# Patient Record
Sex: MALE | Race: WHITE | Employment: OTHER | URBAN - METROPOLITAN AREA
[De-identification: names, ages, dates, MRNs, and addresses within clinical notes are randomized per-mention and may not be internally consistent; named-entity substitution may affect disease eponyms.]

---

## 2018-08-06 ENCOUNTER — HOSPITAL ENCOUNTER (OUTPATIENT)
Age: 63
Discharge: HOME | End: 2018-08-08
Payer: COMMERCIAL

## 2018-08-06 LAB — PROSTATE SPECIFIC ANTIGEN: 1.98 NG/ML (ref 0–4)

## 2018-08-06 PROCEDURE — G0103 PSA SCREENING: HCPCS

## 2018-11-05 ENCOUNTER — OFFICE VISIT (OUTPATIENT)
Dept: BARIATRICS/WEIGHT MGMT | Age: 63
End: 2018-11-05
Payer: COMMERCIAL

## 2018-11-05 VITALS
WEIGHT: 236.8 LBS | SYSTOLIC BLOOD PRESSURE: 157 MMHG | HEART RATE: 79 BPM | BODY MASS INDEX: 37.17 KG/M2 | TEMPERATURE: 98.1 F | DIASTOLIC BLOOD PRESSURE: 80 MMHG | HEIGHT: 67 IN

## 2018-11-05 DIAGNOSIS — E11.9 TYPE 2 DIABETES MELLITUS WITHOUT COMPLICATION, WITHOUT LONG-TERM CURRENT USE OF INSULIN (HCC): Primary | ICD-10-CM

## 2018-11-05 DIAGNOSIS — E66.9 OBESITY (BMI 35.0-39.9 WITHOUT COMORBIDITY): ICD-10-CM

## 2018-11-05 PROCEDURE — G8417 CALC BMI ABV UP PARAM F/U: HCPCS | Performed by: INTERNAL MEDICINE

## 2018-11-05 PROCEDURE — 1036F TOBACCO NON-USER: CPT | Performed by: INTERNAL MEDICINE

## 2018-11-05 PROCEDURE — 99203 OFFICE O/P NEW LOW 30 MIN: CPT | Performed by: INTERNAL MEDICINE

## 2018-11-05 PROCEDURE — G8484 FLU IMMUNIZE NO ADMIN: HCPCS | Performed by: INTERNAL MEDICINE

## 2018-11-05 PROCEDURE — 3046F HEMOGLOBIN A1C LEVEL >9.0%: CPT | Performed by: INTERNAL MEDICINE

## 2018-11-05 PROCEDURE — 2022F DILAT RTA XM EVC RTNOPTHY: CPT | Performed by: INTERNAL MEDICINE

## 2018-11-05 PROCEDURE — 3017F COLORECTAL CA SCREEN DOC REV: CPT | Performed by: INTERNAL MEDICINE

## 2018-11-05 PROCEDURE — 99202 OFFICE O/P NEW SF 15 MIN: CPT

## 2018-11-05 PROCEDURE — G8427 DOCREV CUR MEDS BY ELIG CLIN: HCPCS | Performed by: INTERNAL MEDICINE

## 2018-11-05 RX ORDER — GLUCOSAMINE HCL/CHONDROITIN SU 500-400 MG
CAPSULE ORAL
Qty: 100 STRIP | Refills: 1 | Status: SHIPPED
Start: 2018-11-05 | End: 2020-05-13 | Stop reason: ALTCHOICE

## 2018-11-05 RX ORDER — GEMFIBROZIL 600 MG/1
600 TABLET, FILM COATED ORAL 2 TIMES DAILY
COMMUNITY
Start: 2018-10-02 | End: 2022-10-17 | Stop reason: SDUPTHER

## 2018-11-05 RX ORDER — BLOOD-GLUCOSE METER
KIT MISCELLANEOUS
Qty: 1 KIT | Refills: 0 | Status: SHIPPED
Start: 2018-11-05 | End: 2020-05-13 | Stop reason: ALTCHOICE

## 2018-11-05 RX ORDER — INFLUENZA VIRUS VACCINE 15; 15; 15; 15 UG/.5ML; UG/.5ML; UG/.5ML; UG/.5ML
SUSPENSION INTRAMUSCULAR
COMMUNITY
Start: 2018-09-11 | End: 2020-05-13 | Stop reason: ALTCHOICE

## 2018-11-05 RX ORDER — LANCETS 30 GAUGE
EACH MISCELLANEOUS
Qty: 100 EACH | Refills: 1 | Status: SHIPPED
Start: 2018-11-05 | End: 2020-05-13 | Stop reason: ALTCHOICE

## 2018-11-05 RX ORDER — GLIMEPIRIDE 2 MG/1
4 TABLET ORAL DAILY
COMMUNITY
Start: 2018-08-23 | End: 2022-10-29 | Stop reason: SDUPTHER

## 2018-11-05 RX ORDER — DOXYCYCLINE 100 MG/1
100 TABLET ORAL PRN
COMMUNITY
Start: 2018-09-21 | End: 2020-05-13 | Stop reason: ALTCHOICE

## 2018-11-05 RX ORDER — FINASTERIDE 5 MG/1
5 TABLET, FILM COATED ORAL NIGHTLY
Refills: 0 | COMMUNITY
Start: 2018-09-28

## 2018-11-05 NOTE — PATIENT INSTRUCTIONS
Protein intake should be 70 grams each day  Fiber intake should be 28 grams each day  Take a multivitamin daily (Centrum or One-A-Day)    Breakfast -  one high protein shake  400 kaley 1 cup of the original plain Fiber One cereal    8 oz of 1% or skim milk    Mid-morn  One snack item   Snack  100 kaley    Lunch -  one high protein shake  400   + 1 cup of soup with 5 crackers    Mid-aftern  One snack item   Snack  100 kaley    Dinner -  one frozen meal and 100 kaley of frozen vegetables  500 kaley + one snack item      Shake options:  Premier, Ensure Max and The Praccel options: one small (gala not Honeycrisp) piece of fruit, one 100 kaley serving of low fat [place] yogurt, one 100 kaley serving of low fat cottage cheese, one low fat mozzarella cheese stick, one 100 kaley pack of almonds, 1/2 cup of the original plain Fiber One cereal    Frozen meal options:  Weight Watchers Smart Ones, Lean Cuisine, Healthy Choice (approx 300 kaley each)    Frozen vegetable options:  Bird's Eye, [employer] Giant    Call me with any questions or concerns:  [phone]

## 2018-11-15 ENCOUNTER — CLINICAL DOCUMENTATION (OUTPATIENT)
Dept: ENDOCRINOLOGY | Age: 63
End: 2018-11-15

## 2019-04-02 ENCOUNTER — HOSPITAL ENCOUNTER (OUTPATIENT)
Age: 64
Discharge: HOME | End: 2019-04-04
Payer: COMMERCIAL

## 2019-04-02 PROCEDURE — 87088 URINE BACTERIA CULTURE: CPT

## 2019-04-04 LAB — URINE CULTURE, ROUTINE: NORMAL

## 2019-09-09 ENCOUNTER — HOSPITAL ENCOUNTER (OUTPATIENT)
Dept: NUCLEAR MEDICINE | Age: 64
Discharge: HOME | End: 2019-09-09
Payer: COMMERCIAL

## 2019-09-09 ENCOUNTER — HOSPITAL ENCOUNTER (OUTPATIENT)
Dept: NON INVASIVE DIAGNOSTICS | Age: 64
Discharge: HOME | End: 2019-09-09
Payer: COMMERCIAL

## 2019-09-09 VITALS — HEIGHT: 67 IN | WEIGHT: 225 LBS | BODY MASS INDEX: 35.31 KG/M2

## 2019-09-09 LAB
LV EF: 80 %
LVEF MODALITY: NORMAL

## 2019-09-09 PROCEDURE — 93017 CV STRESS TEST TRACING ONLY: CPT

## 2019-09-09 PROCEDURE — 78452 HT MUSCLE IMAGE SPECT MULT: CPT

## 2019-09-09 PROCEDURE — 93018 CV STRESS TEST I&R ONLY: CPT | Performed by: INTERNAL MEDICINE

## 2019-09-09 PROCEDURE — 3430000000 HC RX DIAGNOSTIC RADIOPHARMACEUTICAL: Performed by: RADIOLOGY

## 2019-09-09 PROCEDURE — 93016 CV STRESS TEST SUPVJ ONLY: CPT | Performed by: INTERNAL MEDICINE

## 2019-09-09 PROCEDURE — A9500 TC99M SESTAMIBI: HCPCS | Performed by: RADIOLOGY

## 2019-09-09 RX ADMIN — Medication 10 MILLICURIE: at 08:49

## 2019-09-09 RX ADMIN — Medication 30 MILLICURIE: at 08:49

## 2020-05-13 ENCOUNTER — HOSPITAL ENCOUNTER (EMERGENCY)
Age: 65
Discharge: HOME | End: 2020-05-13
Attending: EMERGENCY MEDICINE
Payer: MEDICARE

## 2020-05-13 ENCOUNTER — APPOINTMENT (OUTPATIENT)
Dept: CT IMAGING | Age: 65
End: 2020-05-13
Payer: MEDICARE

## 2020-05-13 VITALS
DIASTOLIC BLOOD PRESSURE: 80 MMHG | OXYGEN SATURATION: 98 % | BODY MASS INDEX: 34.21 KG/M2 | SYSTOLIC BLOOD PRESSURE: 142 MMHG | TEMPERATURE: 98.2 F | WEIGHT: 218 LBS | HEART RATE: 76 BPM | RESPIRATION RATE: 16 BRPM | HEIGHT: 67 IN

## 2020-05-13 LAB
ALBUMIN SERPL-MCNC: 4.7 G/DL (ref 3.5–5.2)
ALP BLD-CCNC: 69 U/L (ref 40–129)
ALT SERPL-CCNC: 36 U/L (ref 0–40)
ANION GAP SERPL CALCULATED.3IONS-SCNC: 17 MMOL/L (ref 7–16)
ANION GAP SERPL CALCULATED.3IONS-SCNC: 19 MMOL/L (ref 7–16)
AST SERPL-CCNC: 29 U/L (ref 0–39)
BACTERIA: ABNORMAL /HPF
BASOPHILS ABSOLUTE: 0.06 E9/L (ref 0–0.2)
BASOPHILS RELATIVE PERCENT: 0.5 % (ref 0–2)
BILIRUB SERPL-MCNC: 0.7 MG/DL (ref 0–1.2)
BILIRUBIN URINE: NEGATIVE
BLOOD, URINE: ABNORMAL
BUN BLDV-MCNC: 37 MG/DL (ref 8–23)
BUN BLDV-MCNC: 38 MG/DL (ref 8–23)
CALCIUM SERPL-MCNC: 10.4 MG/DL (ref 8.6–10.2)
CALCIUM SERPL-MCNC: 10.6 MG/DL (ref 8.6–10.2)
CHLORIDE BLD-SCNC: 95 MMOL/L (ref 98–107)
CHLORIDE BLD-SCNC: 99 MMOL/L (ref 98–107)
CLARITY: CLEAR
CO2: 21 MMOL/L (ref 22–29)
CO2: 21 MMOL/L (ref 22–29)
COLOR: YELLOW
CREAT SERPL-MCNC: 0.8 MG/DL (ref 0.7–1.2)
CREAT SERPL-MCNC: 0.9 MG/DL (ref 0.7–1.2)
EKG ATRIAL RATE: 78 BPM
EKG P AXIS: 52 DEGREES
EKG P-R INTERVAL: 160 MS
EKG Q-T INTERVAL: 374 MS
EKG QRS DURATION: 92 MS
EKG QTC CALCULATION (BAZETT): 426 MS
EKG R AXIS: 1 DEGREES
EKG T AXIS: 23 DEGREES
EKG VENTRICULAR RATE: 78 BPM
EOSINOPHILS ABSOLUTE: 0 E9/L (ref 0.05–0.5)
EOSINOPHILS RELATIVE PERCENT: 0 % (ref 0–6)
EPITHELIAL CELLS, UA: ABNORMAL /HPF
GFR AFRICAN AMERICAN: >60
GFR AFRICAN AMERICAN: >60
GFR NON-AFRICAN AMERICAN: >60 ML/MIN/1.73
GFR NON-AFRICAN AMERICAN: >60 ML/MIN/1.73
GLUCOSE BLD-MCNC: 429 MG/DL (ref 74–99)
GLUCOSE BLD-MCNC: 540 MG/DL (ref 74–99)
GLUCOSE URINE: >=1000 MG/DL
HCT VFR BLD CALC: 47.6 % (ref 37–54)
HEMOGLOBIN: 16.6 G/DL (ref 12.5–16.5)
IMMATURE GRANULOCYTES #: 0.2 E9/L
IMMATURE GRANULOCYTES %: 1.8 % (ref 0–5)
KETONES, URINE: NEGATIVE MG/DL
LACTIC ACID: 2.6 MMOL/L (ref 0.5–2.2)
LACTIC ACID: 3.1 MMOL/L (ref 0.5–2.2)
LEUKOCYTE ESTERASE, URINE: NEGATIVE
LIPASE: 97 U/L (ref 13–60)
LYMPHOCYTES ABSOLUTE: 1.35 E9/L (ref 1.5–4)
LYMPHOCYTES RELATIVE PERCENT: 12.2 % (ref 20–42)
MCH RBC QN AUTO: 31.2 PG (ref 26–35)
MCHC RBC AUTO-ENTMCNC: 34.9 % (ref 32–34.5)
MCV RBC AUTO: 89.5 FL (ref 80–99.9)
MONOCYTES ABSOLUTE: 0.54 E9/L (ref 0.1–0.95)
MONOCYTES RELATIVE PERCENT: 4.9 % (ref 2–12)
NEUTROPHILS ABSOLUTE: 8.92 E9/L (ref 1.8–7.3)
NEUTROPHILS RELATIVE PERCENT: 80.6 % (ref 43–80)
NITRITE, URINE: NEGATIVE
PDW BLD-RTO: 12.7 FL (ref 11.5–15)
PH UA: 5.5 (ref 5–9)
PH VENOUS: 7.4 (ref 7.35–7.45)
PLATELET # BLD: 327 E9/L (ref 130–450)
PMV BLD AUTO: 10.9 FL (ref 7–12)
POTASSIUM SERPL-SCNC: 4.6 MMOL/L (ref 3.5–5)
POTASSIUM SERPL-SCNC: 4.8 MMOL/L (ref 3.5–5)
PROTEIN UA: NEGATIVE MG/DL
RBC # BLD: 5.32 E12/L (ref 3.8–5.8)
RBC UA: ABNORMAL /HPF (ref 0–2)
SODIUM BLD-SCNC: 135 MMOL/L (ref 132–146)
SODIUM BLD-SCNC: 137 MMOL/L (ref 132–146)
SPECIFIC GRAVITY UA: 1.01 (ref 1–1.03)
TOTAL PROTEIN: 8 G/DL (ref 6.4–8.3)
TROPONIN: <0.01 NG/ML (ref 0–0.03)
UROBILINOGEN, URINE: 0.2 E.U./DL
WBC # BLD: 11.1 E9/L (ref 4.5–11.5)
WBC UA: ABNORMAL /HPF (ref 0–5)

## 2020-05-13 PROCEDURE — 82800 BLOOD PH: CPT

## 2020-05-13 PROCEDURE — 83605 ASSAY OF LACTIC ACID: CPT

## 2020-05-13 PROCEDURE — 85025 COMPLETE CBC W/AUTO DIFF WBC: CPT

## 2020-05-13 PROCEDURE — 2580000003 HC RX 258: Performed by: EMERGENCY MEDICINE

## 2020-05-13 PROCEDURE — 84484 ASSAY OF TROPONIN QUANT: CPT

## 2020-05-13 PROCEDURE — 93005 ELECTROCARDIOGRAM TRACING: CPT | Performed by: EMERGENCY MEDICINE

## 2020-05-13 PROCEDURE — 74176 CT ABD & PELVIS W/O CONTRAST: CPT

## 2020-05-13 PROCEDURE — 93010 ELECTROCARDIOGRAM REPORT: CPT | Performed by: INTERNAL MEDICINE

## 2020-05-13 PROCEDURE — 99285 EMERGENCY DEPT VISIT HI MDM: CPT

## 2020-05-13 PROCEDURE — 36415 COLL VENOUS BLD VENIPUNCTURE: CPT

## 2020-05-13 PROCEDURE — 83690 ASSAY OF LIPASE: CPT

## 2020-05-13 PROCEDURE — 81001 URINALYSIS AUTO W/SCOPE: CPT

## 2020-05-13 PROCEDURE — 80053 COMPREHEN METABOLIC PANEL: CPT

## 2020-05-13 PROCEDURE — 80048 BASIC METABOLIC PNL TOTAL CA: CPT

## 2020-05-13 RX ORDER — AMOXICILLIN 500 MG
CAPSULE ORAL
COMMUNITY

## 2020-05-13 RX ORDER — 0.9 % SODIUM CHLORIDE 0.9 %
1000 INTRAVENOUS SOLUTION INTRAVENOUS ONCE
Status: COMPLETED | OUTPATIENT
Start: 2020-05-13 | End: 2020-05-13

## 2020-05-13 RX ADMIN — SODIUM CHLORIDE 1000 ML: 9 INJECTION, SOLUTION INTRAVENOUS at 06:45

## 2020-05-13 ASSESSMENT — PAIN DESCRIPTION - ORIENTATION: ORIENTATION: LOWER

## 2020-05-13 ASSESSMENT — PAIN DESCRIPTION - PAIN TYPE: TYPE: ACUTE PAIN

## 2020-05-13 ASSESSMENT — PAIN DESCRIPTION - ONSET: ONSET: ON-GOING

## 2020-05-13 ASSESSMENT — PAIN DESCRIPTION - DESCRIPTORS: DESCRIPTORS: ACHING

## 2020-05-13 ASSESSMENT — PAIN DESCRIPTION - PROGRESSION: CLINICAL_PROGRESSION: NOT CHANGED

## 2020-05-13 ASSESSMENT — PAIN SCALES - GENERAL: PAINLEVEL_OUTOF10: 5

## 2020-05-13 ASSESSMENT — PAIN DESCRIPTION - FREQUENCY: FREQUENCY: INTERMITTENT

## 2020-05-13 ASSESSMENT — PAIN DESCRIPTION - LOCATION: LOCATION: ABDOMEN;BACK

## 2020-05-13 NOTE — ED PROVIDER NOTES
well nourished, no acute distress, non-toxic appearance   Eyes:  PERRL, conjunctiva normal, EOMI  HENT:  Atraumatic, external ears normal, nose normal, oropharynx moist. Neck- normal range of motion, no nuchal rigidity  Respiratory:  No respiratory distress, normal breath sounds, no rales, no wheezing   Cardiovascular:  Normal rate, normal rhythm, no murmurs, no gallops, no rubs. Radial pulses 2+ bilaterally. GI:  Soft, nondistended, normal bowel sounds, nontender, no organomegaly, no mass, no rebound, no guarding   :  No costovertebral angle tenderness   Musculoskeletal:  No edema, no tenderness, no deformities. Back-no midline or paraspinal cervical, thoracic or lumbar tenderness. No step-offs. Chest able. Pelvis stable. Moves all 4 extremities without difficulty or pain. Integument:  Well hydrated, dried poison ivy (with scratches and scabs) in patches on arms and legs (no evidence of infection). Adequate perfusion. Neurologic:  Alert & oriented x 3, CN 2-12 normal, normal gait, no focal deficits noted. Psychiatric:  Speech and behavior appropriate       -------------------------------------------------- RESULTS -------------------------------------------------  I have personally reviewed all laboratory and imaging results for this patient. Results are listed below.      LABS:  Results for orders placed or performed during the hospital encounter of 05/13/20   CBC auto differential   Result Value Ref Range    WBC 11.1 4.5 - 11.5 E9/L    RBC 5.32 3.80 - 5.80 E12/L    Hemoglobin 16.6 (H) 12.5 - 16.5 g/dL    Hematocrit 47.6 37.0 - 54.0 %    MCV 89.5 80.0 - 99.9 fL    MCH 31.2 26.0 - 35.0 pg    MCHC 34.9 (H) 32.0 - 34.5 %    RDW 12.7 11.5 - 15.0 fL    Platelets 086 313 - 035 E9/L    MPV 10.9 7.0 - 12.0 fL    Neutrophils % 80.6 (H) 43.0 - 80.0 %    Immature Granulocytes % 1.8 0.0 - 5.0 %    Lymphocytes % 12.2 (L) 20.0 - 42.0 %    Monocytes % 4.9 2.0 - 12.0 %    Eosinophils % 0.0 0.0 - 6.0 %    Basophils % 0.5 0.0 - 2.0 %    Neutrophils Absolute 8.92 (H) 1.80 - 7.30 E9/L    Immature Granulocytes # 0.20 E9/L    Lymphocytes Absolute 1.35 (L) 1.50 - 4.00 E9/L    Monocytes Absolute 0.54 0.10 - 0.95 E9/L    Eosinophils Absolute 0.00 (L) 0.05 - 0.50 E9/L    Basophils Absolute 0.06 0.00 - 0.20 E9/L   Comprehensive Metabolic Panel   Result Value Ref Range    Sodium 135 132 - 146 mmol/L    Potassium 4.8 3.5 - 5.0 mmol/L    Chloride 95 (L) 98 - 107 mmol/L    CO2 21 (L) 22 - 29 mmol/L    Anion Gap 19 (H) 7 - 16 mmol/L    Glucose 540 (HH) 74 - 99 mg/dL    BUN 38 (H) 8 - 23 mg/dL    CREATININE 0.9 0.7 - 1.2 mg/dL    GFR Non-African American >60 >=60 mL/min/1.73    GFR African American >60     Calcium 10.6 (H) 8.6 - 10.2 mg/dL    Total Protein 8.0 6.4 - 8.3 g/dL    Alb 4.7 3.5 - 5.2 g/dL    Total Bilirubin 0.7 0.0 - 1.2 mg/dL    Alkaline Phosphatase 69 40 - 129 U/L    ALT 36 0 - 40 U/L    AST 29 0 - 39 U/L   Lactic Acid, Plasma   Result Value Ref Range    Lactic Acid 3.1 (H) 0.5 - 2.2 mmol/L   Lipase   Result Value Ref Range    Lipase 97 (H) 13 - 60 U/L   Troponin   Result Value Ref Range    Troponin <0.01 0.00 - 0.03 ng/mL   Urinalysis with Microscopic   Result Value Ref Range    Color, UA Yellow Straw/Yellow    Clarity, UA Clear Clear    Glucose, Ur >=1000 (A) Negative mg/dL    Bilirubin Urine Negative Negative    Ketones, Urine Negative Negative mg/dL    Specific Gravity, UA 1.010 1.005 - 1.030    Blood, Urine SMALL (A) Negative    pH, UA 5.5 5.0 - 9.0    Protein, UA Negative Negative mg/dL    Urobilinogen, Urine 0.2 <2.0 E.U./dL    Nitrite, Urine Negative Negative    Leukocyte Esterase, Urine Negative Negative    WBC, UA 0-1 0 - 5 /HPF    RBC, UA 2-5 0 - 2 /HPF    Epithelial Cells, UA FEW /HPF    Bacteria, UA NONE SEEN None Seen /HPF   Basic Metabolic Panel   Result Value Ref Range    Sodium 137 132 - 146 mmol/L    Potassium 4.6 3.5 - 5.0 mmol/L    Chloride 99 98 - 107 mmol/L    CO2 21 (L) 22 - 29 mmol/L    Anion Gap 17 (H) 7 - care and counseling regarding the diagnosis and prognosis. Questions are answered at this time and they are agreeable with the plan.       --------------------------------- IMPRESSION AND DISPOSITION ---------------------------------    IMPRESSION  1. Uncontrolled type 2 diabetes mellitus with hyperglycemia ([address]    2. Fatty liver    3. Lower abdominal pain    4.  Enlarged prostate        DISPOSITION  Disposition: Discharge to home  Patient condition is stable                  Brannon Bnyum DO  05/13/20 112

## 2020-05-13 NOTE — ED NOTES
Patient educated with diabetic emergencies and different symptoms of hypoglycemia and hyperglycemia. Pt instructed to call PCP today and talk about his diabetes. Pt verbalized understanding of education and follow up.       Nikki España RN  05/13/20 3403

## 2020-10-09 LAB
A/G RATIO: NORMAL
ALBUMIN SERPL-MCNC: NORMAL G/DL
ALP BLD-CCNC: NORMAL U/L
ALT SERPL-CCNC: NORMAL U/L
AST SERPL-CCNC: NORMAL U/L
BILIRUB SERPL-MCNC: NORMAL MG/DL
BILIRUBIN DIRECT: NORMAL
BILIRUBIN, INDIRECT: NORMAL
GLOBULIN: NORMAL
PROTEIN TOTAL: NORMAL

## 2021-02-04 LAB
ALBUMIN SERPL-MCNC: NORMAL G/DL
ALP BLD-CCNC: NORMAL U/L
ALT SERPL-CCNC: NORMAL U/L
ANION GAP SERPL CALCULATED.3IONS-SCNC: NORMAL MMOL/L
AST SERPL-CCNC: NORMAL U/L
AVERAGE GLUCOSE: 192
BASOPHILS ABSOLUTE: NORMAL
BASOPHILS RELATIVE PERCENT: NORMAL
BILIRUB SERPL-MCNC: NORMAL MG/DL
BUN BLDV-MCNC: NORMAL MG/DL
CALCIUM SERPL-MCNC: NORMAL MG/DL
CHLORIDE BLD-SCNC: NORMAL MMOL/L
CHOLESTEROL, TOTAL: NORMAL
CHOLESTEROL/HDL RATIO: NORMAL
CO2: NORMAL
CREAT SERPL-MCNC: NORMAL MG/DL
EOSINOPHILS ABSOLUTE: NORMAL
EOSINOPHILS RELATIVE PERCENT: NORMAL
GFR CALCULATED: NORMAL
GLUCOSE BLD-MCNC: NORMAL MG/DL
HBA1C MFR BLD: 8.3 %
HCT VFR BLD CALC: NORMAL %
HDLC SERPL-MCNC: NORMAL MG/DL
HEMOGLOBIN: NORMAL
LDL CHOLESTEROL CALCULATED: NORMAL
LYMPHOCYTES ABSOLUTE: NORMAL
LYMPHOCYTES RELATIVE PERCENT: NORMAL
MCH RBC QN AUTO: NORMAL PG
MCHC RBC AUTO-ENTMCNC: NORMAL G/DL
MCV RBC AUTO: NORMAL FL
MONOCYTES ABSOLUTE: NORMAL
MONOCYTES RELATIVE PERCENT: NORMAL
NEUTROPHILS ABSOLUTE: NORMAL
NEUTROPHILS RELATIVE PERCENT: NORMAL
NONHDLC SERPL-MCNC: NORMAL MG/DL
PLATELET # BLD: NORMAL 10*3/UL
PMV BLD AUTO: NORMAL FL
POTASSIUM SERPL-SCNC: NORMAL MMOL/L
RBC # BLD: NORMAL 10*6/UL
SODIUM BLD-SCNC: NORMAL MMOL/L
TOTAL PROTEIN: NORMAL
TRIGL SERPL-MCNC: NORMAL MG/DL
VLDLC SERPL CALC-MCNC: NORMAL MG/DL
WBC # BLD: NORMAL 10*3/UL

## 2021-07-28 ENCOUNTER — HOSPITAL ENCOUNTER (OUTPATIENT)
Age: 66
Discharge: HOME | End: 2021-07-28
Payer: MEDICARE

## 2021-07-28 LAB
ALBUMIN SERPL-MCNC: 4.1 G/DL (ref 3.5–5.2)
ALP BLD-CCNC: 62 U/L (ref 40–129)
ALT SERPL-CCNC: 35 U/L (ref 0–40)
ANION GAP SERPL CALCULATED.3IONS-SCNC: 10 MMOL/L (ref 7–16)
AST SERPL-CCNC: 30 U/L (ref 0–39)
BILIRUB SERPL-MCNC: 0.9 MG/DL (ref 0–1.2)
BUN BLDV-MCNC: 15 MG/DL (ref 6–23)
CALCIUM SERPL-MCNC: 10 MG/DL (ref 8.6–10.2)
CHLORIDE BLD-SCNC: 99 MMOL/L (ref 98–107)
CHOLESTEROL, FASTING: 174 MG/DL (ref 0–199)
CO2: 26 MMOL/L (ref 22–29)
CREAT SERPL-MCNC: 0.8 MG/DL (ref 0.7–1.2)
GFR AFRICAN AMERICAN: >60
GFR NON-AFRICAN AMERICAN: >60 ML/MIN/1.73
GLUCOSE BLD-MCNC: 247 MG/DL (ref 74–99)
HBA1C MFR BLD: 9 % (ref 4–5.6)
HCT VFR BLD CALC: 42.8 % (ref 37–54)
HDLC SERPL-MCNC: 32 MG/DL
HEMOGLOBIN: 15 G/DL (ref 12.5–16.5)
LDL CHOLESTEROL CALCULATED: 77 MG/DL (ref 0–99)
MCH RBC QN AUTO: 32.8 PG (ref 26–35)
MCHC RBC AUTO-ENTMCNC: 35 % (ref 32–34.5)
MCV RBC AUTO: 93.4 FL (ref 80–99.9)
PDW BLD-RTO: 13.2 FL (ref 11.5–15)
PLATELET # BLD: 256 E9/L (ref 130–450)
PMV BLD AUTO: 10.7 FL (ref 7–12)
POTASSIUM SERPL-SCNC: 4.5 MMOL/L (ref 3.5–5)
PROSTATE SPECIFIC ANTIGEN: 1.52 NG/ML (ref 0–4)
RBC # BLD: 4.58 E12/L (ref 3.8–5.8)
SODIUM BLD-SCNC: 135 MMOL/L (ref 132–146)
TOTAL PROTEIN: 7.2 G/DL (ref 6.4–8.3)
TRIGLYCERIDE, FASTING: 325 MG/DL (ref 0–149)
VLDLC SERPL CALC-MCNC: 65 MG/DL
WBC # BLD: 5.8 E9/L (ref 4.5–11.5)

## 2021-07-28 PROCEDURE — 80061 LIPID PANEL: CPT

## 2021-07-28 PROCEDURE — 80053 COMPREHEN METABOLIC PANEL: CPT

## 2021-07-28 PROCEDURE — 36415 COLL VENOUS BLD VENIPUNCTURE: CPT

## 2021-07-28 PROCEDURE — 84153 ASSAY OF PSA TOTAL: CPT

## 2021-07-28 PROCEDURE — 85027 COMPLETE CBC AUTOMATED: CPT

## 2021-07-28 PROCEDURE — 83036 HEMOGLOBIN GLYCOSYLATED A1C: CPT

## 2021-09-30 LAB
ALBUMIN SERPL-MCNC: NORMAL G/DL
ALP BLD-CCNC: NORMAL U/L
ALT SERPL-CCNC: NORMAL U/L
ANION GAP SERPL CALCULATED.3IONS-SCNC: NORMAL MMOL/L
AST SERPL-CCNC: NORMAL U/L
AVERAGE GLUCOSE: NORMAL
BILIRUB SERPL-MCNC: NORMAL MG/DL
BUN BLDV-MCNC: NORMAL MG/DL
CALCIUM SERPL-MCNC: NORMAL MG/DL
CHLORIDE BLD-SCNC: NORMAL MMOL/L
CHOLESTEROL, TOTAL: NORMAL
CHOLESTEROL/HDL RATIO: NORMAL
CO2: NORMAL
CREAT SERPL-MCNC: NORMAL MG/DL
GFR CALCULATED: NORMAL
GLUCOSE BLD-MCNC: NORMAL MG/DL
HBA1C MFR BLD: 9 %
HDLC SERPL-MCNC: NORMAL MG/DL
LDL CHOLESTEROL CALCULATED: NORMAL
NONHDLC SERPL-MCNC: NORMAL MG/DL
POTASSIUM SERPL-SCNC: NORMAL MMOL/L
SODIUM BLD-SCNC: NORMAL MMOL/L
TOTAL PROTEIN: NORMAL
TRIGL SERPL-MCNC: NORMAL MG/DL
VLDLC SERPL CALC-MCNC: NORMAL MG/DL

## 2021-12-21 LAB
ALBUMIN SERPL-MCNC: NORMAL G/DL
ALP BLD-CCNC: NORMAL U/L
ALT SERPL-CCNC: NORMAL U/L
ANION GAP SERPL CALCULATED.3IONS-SCNC: NORMAL MMOL/L
AST SERPL-CCNC: NORMAL U/L
AVERAGE GLUCOSE: NORMAL
BILIRUB SERPL-MCNC: NORMAL MG/DL
BUN BLDV-MCNC: NORMAL MG/DL
CALCIUM SERPL-MCNC: NORMAL MG/DL
CHLORIDE BLD-SCNC: NORMAL MMOL/L
CHOLESTEROL, TOTAL: NORMAL
CHOLESTEROL/HDL RATIO: NORMAL
CO2: NORMAL
CREAT SERPL-MCNC: NORMAL MG/DL
GFR CALCULATED: NORMAL
GLUCOSE BLD-MCNC: NORMAL MG/DL
HBA1C MFR BLD: 9.7 %
HDLC SERPL-MCNC: NORMAL MG/DL
LDL CHOLESTEROL CALCULATED: NORMAL
NONHDLC SERPL-MCNC: NORMAL MG/DL
POTASSIUM SERPL-SCNC: NORMAL MMOL/L
SODIUM BLD-SCNC: NORMAL MMOL/L
TOTAL PROTEIN: NORMAL
TRIGL SERPL-MCNC: NORMAL MG/DL
VLDLC SERPL CALC-MCNC: NORMAL MG/DL

## 2021-12-27 LAB — FECAL BLOOD IMMUNOCHEMICAL TEST: NORMAL

## 2022-01-10 LAB — SARS-COV-2: NORMAL

## 2022-10-17 RX ORDER — GEMFIBROZIL 600 MG/1
600 TABLET, FILM COATED ORAL 2 TIMES DAILY
Qty: 180 TABLET | Refills: 0 | Status: SHIPPED | OUTPATIENT
Start: 2022-10-17

## 2022-10-17 NOTE — TELEPHONE ENCOUNTER
Received a call from George Laird requesting a refill of his Gemfibrozil 600 mg be sent into [facility] in [facility]. Requesting 180 tablets. Thank you.

## 2022-10-21 ENCOUNTER — OFFICE VISIT (OUTPATIENT)
Dept: PRIMARY CARE CLINIC | Age: 67
End: 2022-10-21
Payer: MEDICARE

## 2022-10-21 VITALS
TEMPERATURE: 97.9 F | DIASTOLIC BLOOD PRESSURE: 86 MMHG | BODY MASS INDEX: 34.06 KG/M2 | SYSTOLIC BLOOD PRESSURE: 136 MMHG | HEART RATE: 90 BPM | HEIGHT: 67 IN | WEIGHT: 217 LBS | OXYGEN SATURATION: 98 %

## 2022-10-21 DIAGNOSIS — U07.1 COVID-19: ICD-10-CM

## 2022-10-21 PROCEDURE — 87426 SARSCOV CORONAVIRUS AG IA: CPT | Performed by: INTERNAL MEDICINE

## 2022-10-21 PROCEDURE — 99213 OFFICE O/P EST LOW 20 MIN: CPT | Performed by: INTERNAL MEDICINE

## 2022-10-21 PROCEDURE — 1123F ACP DISCUSS/DSCN MKR DOCD: CPT | Performed by: INTERNAL MEDICINE

## 2022-10-21 RX ORDER — DEXAMETHASONE 0.5 MG/1
4 TABLET ORAL EVERY 6 HOURS
Qty: 224 TABLET | Refills: 0 | Status: SHIPPED | OUTPATIENT
Start: 2022-10-21 | End: 2022-10-28

## 2022-10-21 SDOH — ECONOMIC STABILITY: FOOD INSECURITY: WITHIN THE PAST 12 MONTHS, THE FOOD YOU BOUGHT JUST DIDN'T LAST AND YOU DIDN'T HAVE MONEY TO GET MORE.: NEVER TRUE

## 2022-10-21 SDOH — ECONOMIC STABILITY: FOOD INSECURITY: WITHIN THE PAST 12 MONTHS, YOU WORRIED THAT YOUR FOOD WOULD RUN OUT BEFORE YOU GOT MONEY TO BUY MORE.: NEVER TRUE

## 2022-10-21 ASSESSMENT — PATIENT HEALTH QUESTIONNAIRE - PHQ9
SUM OF ALL RESPONSES TO PHQ9 QUESTIONS 1 & 2: 0
SUM OF ALL RESPONSES TO PHQ QUESTIONS 1-9: 0
2. FEELING DOWN, DEPRESSED OR HOPELESS: 0
1. LITTLE INTEREST OR PLEASURE IN DOING THINGS: 0

## 2022-10-21 ASSESSMENT — SOCIAL DETERMINANTS OF HEALTH (SDOH): HOW HARD IS IT FOR YOU TO PAY FOR THE VERY BASICS LIKE FOOD, HOUSING, MEDICAL CARE, AND HEATING?: NOT HARD AT ALL

## 2022-10-21 NOTE — PROGRESS NOTES
Juan M Joshi is a 78 y.o. male with the following history of DM hyperlipidemia for follow up been coughing sore throat tired   Tested pos. For Covid     Past Medical History:   Diagnosis Date    Hyperlipidemia     Obesity (BMI 35.0-39.9 without comorbidity)     T2DM (type 2 diabetes mellitus) ([facility] [address]        No past surgical history on file. No family history on file. Current Outpatient Medications:     dexamethasone (DECADRON) 0.5 MG tablet, Take 8 tablets by mouth in the morning and 8 tablets at noon and 8 tablets in the evening and 8 tablets before bedtime. Do all this for 7 days. , Disp: 224 tablet, Rfl: 0    empagliflozin (JARDIANCE) 25 MG tablet, Take 25 mg by mouth daily, Disp: , Rfl:     gemfibrozil (LOPID) 600 MG tablet, Take 1 tablet by mouth 2 times daily, Disp: 180 tablet, Rfl: 0    metFORMIN (GLUCOPHAGE) 1000 MG tablet, Take 1 tablet by mouth 2 times daily (with meals), Disp: 180 tablet, Rfl: 1    Omega-3 Fatty Acids (FISH OIL) 1200 MG CAPS, Take by mouth, Disp: , Rfl:     glimepiride (AMARYL) 2 MG tablet, Take 4 mg by mouth daily, Disp: , Rfl:     finasteride (PROSCAR) 5 MG tablet, Take 5 mg by mouth nightly, Disp: , Rfl: 0    ASPIRIN 81 PO, Take 81 mg by mouth daily, Disp: , Rfl:      Allergies: Patient has no known allergies.     Social History     Tobacco Use    Smoking status: Never    Smokeless tobacco: Never   Substance Use Topics    Alcohol use: Not Currently        Review of Systems:  Respiratory: negative for cough and hemoptysis  Cardiovascular: negative for chest pain and dyspnea  Gastrointestinal: negative for abdominal pain, diarrhea, nausea and vomiting  Genitourinary:negative for dysuria and hematuria  Derm: negative for rash and skin lesion(s)  Neurological: negative for seizures and tremors  Endocrine: negative for diabetic symptoms including polydipsia and polyuria    Physical Exam:  Vitals:    10/21/22 1008   BP: 136/86   Pulse: 90   Temp: 97.9 °F (36.6 °C)   SpO2: 98%      Wt Readings from Last 3 Encounters:   10/21/22 217 lb (98.4 kg)   05/13/20 218 lb (98.9 kg)   09/09/19 225 lb (102.1 kg)       Skin:  Warm and dry. No rash or bruises  HEENT:  PERRLA, EOMI  Neck:  No JVD, No thyromegaly, No carotid bruit  Cardiac:  RRR, No gallop or murmur  Lungs:  CTA, Normal percussion  Abdomen: Normal bowel sounds, no HSM, non-tender  Extremities:  No clubbing, edema or cyanosis  Neurological:  Moves all extremities. Lab Results   Component Value Date     07/28/2021    K 4.5 07/28/2021    CL 99 07/28/2021    CO2 26 07/28/2021    BUN 15 07/28/2021    CREATININE 0.8 07/28/2021    GLUCOSE 247 (H) 07/28/2021    CALCIUM 10.0 07/28/2021    PROT 7.2 07/28/2021    LABALBU 4.1 07/28/2021    BILITOT 0.9 07/28/2021    ALKPHOS 62 07/28/2021    AST 30 07/28/2021    ALT 35 07/28/2021    LABGLOM >60 07/28/2021    GFRAA >60 07/28/2021     Lab Results   Component Value Date    WBC 5.8 07/28/2021    HGB 15.0 07/28/2021    HCT 42.8 07/28/2021    MCV 93.4 07/28/2021     07/28/2021     Lab Results   Component Value Date    CHOL 179 09/20/2017    TRIG 288 (H) 09/20/2017    HDL 32 07/28/2021    LDLCALC 77 07/28/2021    LABVLDL 65 07/28/2021     Lab Results   Component Value Date    PSA 1.75 08/08/2022    PSADIA 3.73 08/13/2012      Lab Results   Component Value Date    LABA1C 9.7 12/21/2021           Assessment and Plan:    Patient Active Problem List   Diagnosis    T2DM (type 2 diabetes mellitus) ([facility] [address]    Obesity (BMI 35.0-39.9 without comorbidity)    Precordial pain    COVID-19       Diagnosis/Orders  1. COVID-19  -     POCT COVID-19, Antigen    Decadron 4 mg qd 1 week  Vitamin   Return in about 3 months (around 1/21/2023).       Brayan Bejarano MD

## 2022-10-26 ENCOUNTER — TELEPHONE (OUTPATIENT)
Dept: PRIMARY CARE CLINIC | Age: 67
End: 2022-10-26

## 2022-10-26 NOTE — TELEPHONE ENCOUNTER
Patient called to let Dr know he stopped taking the below medication due to it gives him the hiccups and stops him from breathing     dexamethasone (DECADRON) 0.5 MG tablet      Patient asked that the Dr not call from personal cell phone as pt will not answer unknown calls , pt asked that Dr call from the office phone

## 2022-10-29 RX ORDER — GLIMEPIRIDE 2 MG/1
4 TABLET ORAL DAILY
Qty: 90 TABLET | Refills: 0 | Status: SHIPPED | OUTPATIENT
Start: 2022-10-29

## 2022-11-04 ENCOUNTER — TELEPHONE (OUTPATIENT)
Dept: PRIMARY CARE CLINIC | Age: 67
End: 2022-11-04

## 2022-11-04 NOTE — TELEPHONE ENCOUNTER
Received a call from Zoe Roberto stating he was here last week and was told to take over the counter medications for his cough. States they are not helping and is requesting something be called into [facility] in [place] for his cough. Thank you.

## 2022-11-08 ENCOUNTER — OFFICE VISIT (OUTPATIENT)
Dept: PRIMARY CARE CLINIC | Age: 67
End: 2022-11-08
Payer: MEDICARE

## 2022-11-08 VITALS
HEART RATE: 72 BPM | TEMPERATURE: 98.4 F | WEIGHT: 216 LBS | BODY MASS INDEX: 33.9 KG/M2 | HEIGHT: 67 IN | OXYGEN SATURATION: 98 % | SYSTOLIC BLOOD PRESSURE: 130 MMHG | DIASTOLIC BLOOD PRESSURE: 62 MMHG

## 2022-11-08 DIAGNOSIS — R05.3 CHRONIC COUGH: Primary | ICD-10-CM

## 2022-11-08 PROCEDURE — 99213 OFFICE O/P EST LOW 20 MIN: CPT | Performed by: INTERNAL MEDICINE

## 2022-11-08 PROCEDURE — 1123F ACP DISCUSS/DSCN MKR DOCD: CPT | Performed by: INTERNAL MEDICINE

## 2022-11-08 RX ORDER — METHOTREXATE 2.5 MG/1
3 TABLET ORAL
COMMUNITY
Start: 2022-08-30

## 2022-11-08 RX ORDER — BENZONATATE 100 MG/1
100 CAPSULE ORAL 2 TIMES DAILY PRN
Qty: 20 CAPSULE | Refills: 0 | Status: SHIPPED | OUTPATIENT
Start: 2022-11-08 | End: 2022-11-15

## 2022-11-08 NOTE — PROGRESS NOTES
Shira Carcamo is a 78 y.o. male with cough for few weeks taking over the counter meds no help no fever no sore throat or congestion     Past Medical History:   Diagnosis Date    Hyperlipidemia     Obesity (BMI 35.0-39.9 without comorbidity)     T2DM (type 2 diabetes mellitus) ([facility] [address]        No past surgical history on file. No family history on file. Current Outpatient Medications:     methotrexate (RHEUMATREX) 2.5 MG chemo tablet, Take 3 tablets by mouth, Disp: , Rfl:     benzonatate (TESSALON) 100 MG capsule, Take 1 capsule by mouth 2 times daily as needed for Cough, Disp: 20 capsule, Rfl: 0    glimepiride (AMARYL) 2 MG tablet, Take 2 tablets by mouth daily, Disp: 90 tablet, Rfl: 0    empagliflozin (JARDIANCE) 25 MG tablet, Take 25 mg by mouth daily, Disp: , Rfl:     gemfibrozil (LOPID) 600 MG tablet, Take 1 tablet by mouth 2 times daily, Disp: 180 tablet, Rfl: 0    metFORMIN (GLUCOPHAGE) 1000 MG tablet, Take 1 tablet by mouth 2 times daily (with meals), Disp: 180 tablet, Rfl: 1    Omega-3 Fatty Acids (FISH OIL) 1200 MG CAPS, Take by mouth, Disp: , Rfl:     finasteride (PROSCAR) 5 MG tablet, Take 5 mg by mouth nightly, Disp: , Rfl: 0    ASPIRIN 81 PO, Take 81 mg by mouth daily, Disp: , Rfl:      Allergies: Patient has no known allergies.     Social History     Tobacco Use    Smoking status: Never    Smokeless tobacco: Never   Substance Use Topics    Alcohol use: Not Currently        Review of Systems:  Respiratory: negative for cough and hemoptysis  Cardiovascular: negative for chest pain and dyspnea  Gastrointestinal: negative for abdominal pain, diarrhea, nausea and vomiting  Genitourinary:negative for dysuria and hematuria  Derm: negative for rash and skin lesion(s)  Neurological: negative for seizures and tremors  Endocrine: negative for diabetic symptoms including polydipsia and polyuria    Physical Exam:  Vitals:    11/08/22 0854   BP: 130/62   Pulse: 72   Temp: 98.4 °F (36.9 °C)   SpO2: 98%      Wt Readings from Last 3 Encounters:   11/08/22 216 lb (98 kg)   10/21/22 217 lb (98.4 kg)   05/13/20 218 lb (98.9 kg)       Skin:  Warm and dry. No rash or bruises  HEENT:  PERRLA, EOMI  Neck:  No JVD, No thyromegaly, No carotid bruit  Cardiac:  RRR, No gallop or murmur  Lungs:  CTA, Normal percussion  Abdomen: Normal bowel sounds, no HSM, non-tender  Extremities:  No clubbing, edema or cyanosis  Neurological:  Moves all extremities    Lab Results   Component Value Date     07/28/2021    K 4.5 07/28/2021    CL 99 07/28/2021    CO2 26 07/28/2021    BUN 15 07/28/2021    CREATININE 0.8 07/28/2021    GLUCOSE 247 (H) 07/28/2021    CALCIUM 10.0 07/28/2021    PROT 7.2 07/28/2021    LABALBU 4.1 07/28/2021    BILITOT 0.9 07/28/2021    ALKPHOS 62 07/28/2021    AST 30 07/28/2021    ALT 35 07/28/2021    LABGLOM >60 07/28/2021    GFRAA >60 07/28/2021     Lab Results   Component Value Date    WBC 5.8 07/28/2021    HGB 15.0 07/28/2021    HCT 42.8 07/28/2021    MCV 93.4 07/28/2021     07/28/2021     Lab Results   Component Value Date    CHOL 179 09/20/2017    TRIG 288 (H) 09/20/2017    HDL 32 07/28/2021    LDLCALC 77 07/28/2021    LABVLDL 65 07/28/2021     Lab Results   Component Value Date    PSA 1.75 08/08/2022    PSADIA 3.73 08/13/2012      Lab Results   Component Value Date    LABA1C 9.7 12/21/2021           Assessment and Plan:    Patient Active Problem List   Diagnosis    T2DM (type 2 diabetes mellitus) (HCC)    Obesity (BMI 35.0-39.9 without comorbidity)    Precordial pain    COVID-19       Diagnosis/Orders  1. Chronic cough    No follow-ups on file.       Emir Novoa MD

## 2022-11-18 ENCOUNTER — TELEPHONE (OUTPATIENT)
Dept: PRIMARY CARE CLINIC | Age: 67
End: 2022-11-18

## 2022-11-18 NOTE — TELEPHONE ENCOUNTER
Pt called stating he still has a cough after finishing the medication that was sent in for him on 11/8/2022.  He is now requesting to have a cough syrup filled for him at [address] [place] [address]

## 2022-12-21 ENCOUNTER — HOSPITAL ENCOUNTER (OUTPATIENT)
Age: 67
Discharge: HOME | End: 2022-12-23

## 2023-01-31 ENCOUNTER — COMMUNITY OUTREACH (OUTPATIENT)
Dept: PRIMARY CARE CLINIC | Age: 68
End: 2023-01-31

## 2023-02-04 ENCOUNTER — HOSPITAL ENCOUNTER (EMERGENCY)
Age: 68
Discharge: HOME | End: 2023-02-05
Attending: EMERGENCY MEDICINE
Payer: MEDICARE

## 2023-02-04 DIAGNOSIS — R33.9 URINARY RETENTION: Primary | ICD-10-CM

## 2023-02-04 DIAGNOSIS — R31.9 URINARY TRACT INFECTION WITH HEMATURIA, SITE UNSPECIFIED: ICD-10-CM

## 2023-02-04 DIAGNOSIS — N39.0 URINARY TRACT INFECTION WITH HEMATURIA, SITE UNSPECIFIED: ICD-10-CM

## 2023-02-04 PROCEDURE — 99284 EMERGENCY DEPT VISIT MOD MDM: CPT

## 2023-02-04 PROCEDURE — 96372 THER/PROPH/DIAG INJ SC/IM: CPT

## 2023-02-04 PROCEDURE — 96374 THER/PROPH/DIAG INJ IV PUSH: CPT

## 2023-02-04 ASSESSMENT — PAIN DESCRIPTION - ORIENTATION: ORIENTATION: MID

## 2023-02-04 ASSESSMENT — PAIN DESCRIPTION - FREQUENCY: FREQUENCY: CONTINUOUS

## 2023-02-04 ASSESSMENT — PAIN DESCRIPTION - ONSET: ONSET: SUDDEN

## 2023-02-04 ASSESSMENT — PAIN - FUNCTIONAL ASSESSMENT: PAIN_FUNCTIONAL_ASSESSMENT: 0-10

## 2023-02-04 ASSESSMENT — PAIN DESCRIPTION - DESCRIPTORS: DESCRIPTORS: PRESSURE

## 2023-02-04 ASSESSMENT — PAIN DESCRIPTION - PAIN TYPE: TYPE: ACUTE PAIN

## 2023-02-04 ASSESSMENT — PAIN SCALES - GENERAL: PAINLEVEL_OUTOF10: 10

## 2023-02-04 ASSESSMENT — PAIN DESCRIPTION - LOCATION: LOCATION: ABDOMEN;PELVIS

## 2023-02-05 ENCOUNTER — APPOINTMENT (OUTPATIENT)
Dept: CT IMAGING | Age: 68
End: 2023-02-05
Payer: MEDICARE

## 2023-02-05 ENCOUNTER — HOSPITAL ENCOUNTER (INPATIENT)
Age: 68
LOS: 1 days | Discharge: HOME HEALTH | DRG: 726 | End: 2023-02-06
Attending: INTERNAL MEDICINE | Admitting: INTERNAL MEDICINE
Payer: MEDICARE

## 2023-02-05 ENCOUNTER — HOSPITAL ENCOUNTER (EMERGENCY)
Age: 68
Discharge: HOME | End: 2023-02-05
Attending: STUDENT IN AN ORGANIZED HEALTH CARE EDUCATION/TRAINING PROGRAM
Payer: MEDICARE

## 2023-02-05 ENCOUNTER — HOSPITAL ENCOUNTER (EMERGENCY)
Age: 68
Discharge: ANOTHER HOSPITAL | End: 2023-02-05
Payer: MEDICARE

## 2023-02-05 VITALS
SYSTOLIC BLOOD PRESSURE: 133 MMHG | OXYGEN SATURATION: 97 % | HEIGHT: 67 IN | WEIGHT: 200 LBS | BODY MASS INDEX: 31.39 KG/M2 | DIASTOLIC BLOOD PRESSURE: 67 MMHG | HEART RATE: 85 BPM | TEMPERATURE: 98.6 F | RESPIRATION RATE: 18 BRPM

## 2023-02-05 VITALS
WEIGHT: 200 LBS | OXYGEN SATURATION: 97 % | DIASTOLIC BLOOD PRESSURE: 74 MMHG | SYSTOLIC BLOOD PRESSURE: 122 MMHG | RESPIRATION RATE: 16 BRPM | BODY MASS INDEX: 31.39 KG/M2 | HEART RATE: 82 BPM | HEIGHT: 67 IN | TEMPERATURE: 97.8 F

## 2023-02-05 VITALS
HEART RATE: 101 BPM | DIASTOLIC BLOOD PRESSURE: 67 MMHG | RESPIRATION RATE: 18 BRPM | TEMPERATURE: 97.8 F | OXYGEN SATURATION: 97 % | SYSTOLIC BLOOD PRESSURE: 138 MMHG

## 2023-02-05 DIAGNOSIS — N39.0 COMPLICATED UTI (URINARY TRACT INFECTION): ICD-10-CM

## 2023-02-05 DIAGNOSIS — N13.9 ACUTE URINARY OBSTRUCTION: Primary | ICD-10-CM

## 2023-02-05 DIAGNOSIS — N30.01 ACUTE CYSTITIS WITH HEMATURIA: ICD-10-CM

## 2023-02-05 DIAGNOSIS — T83.9XXA FOLEY CATHETER PROBLEM, INITIAL ENCOUNTER (HCC): Primary | ICD-10-CM

## 2023-02-05 LAB
ALBUMIN SERPL-MCNC: 4.2 G/DL (ref 3.5–5.2)
ALBUMIN SERPL-MCNC: 4.2 G/DL (ref 3.5–5.2)
ALP BLD-CCNC: 81 U/L (ref 40–129)
ALP BLD-CCNC: 83 U/L (ref 40–129)
ALT SERPL-CCNC: 22 U/L (ref 0–40)
ALT SERPL-CCNC: 22 U/L (ref 0–40)
ANION GAP SERPL CALCULATED.3IONS-SCNC: 15 MMOL/L (ref 7–16)
ANION GAP SERPL CALCULATED.3IONS-SCNC: 15 MMOL/L (ref 7–16)
AST SERPL-CCNC: 18 U/L (ref 0–39)
AST SERPL-CCNC: 20 U/L (ref 0–39)
BACTERIA: ABNORMAL /HPF
BASOPHILS ABSOLUTE: 0.04 E9/L (ref 0–0.2)
BASOPHILS ABSOLUTE: 0.05 E9/L (ref 0–0.2)
BASOPHILS RELATIVE PERCENT: 0.4 % (ref 0–2)
BASOPHILS RELATIVE PERCENT: 0.8 % (ref 0–2)
BILIRUB SERPL-MCNC: 0.6 MG/DL (ref 0–1.2)
BILIRUB SERPL-MCNC: 1 MG/DL (ref 0–1.2)
BILIRUBIN URINE: NEGATIVE
BLOOD, URINE: ABNORMAL
BUN BLDV-MCNC: 19 MG/DL (ref 6–23)
BUN BLDV-MCNC: 27 MG/DL (ref 6–23)
CALCIUM SERPL-MCNC: 9.6 MG/DL (ref 8.6–10.2)
CALCIUM SERPL-MCNC: 9.7 MG/DL (ref 8.6–10.2)
CHLORIDE BLD-SCNC: 101 MMOL/L (ref 98–107)
CHLORIDE BLD-SCNC: 98 MMOL/L (ref 98–107)
CHP ED QC CHECK: NORMAL
CLARITY: ABNORMAL
CO2: 21 MMOL/L (ref 22–29)
CO2: 21 MMOL/L (ref 22–29)
COLOR: YELLOW
CREAT SERPL-MCNC: 0.8 MG/DL (ref 0.7–1.2)
CREAT SERPL-MCNC: 0.9 MG/DL (ref 0.7–1.2)
EOSINOPHILS ABSOLUTE: 0.06 E9/L (ref 0.05–0.5)
EOSINOPHILS ABSOLUTE: 0.25 E9/L (ref 0.05–0.5)
EOSINOPHILS RELATIVE PERCENT: 0.6 % (ref 0–6)
EOSINOPHILS RELATIVE PERCENT: 4 % (ref 0–6)
GFR SERPL CREATININE-BSD FRML MDRD: >60 ML/MIN/1.73
GFR SERPL CREATININE-BSD FRML MDRD: >60 ML/MIN/1.73
GLUCOSE BLD-MCNC: 238 MG/DL
GLUCOSE BLD-MCNC: 272 MG/DL (ref 74–99)
GLUCOSE BLD-MCNC: 390 MG/DL (ref 74–99)
GLUCOSE URINE: >=1000 MG/DL
HCT VFR BLD CALC: 42.4 % (ref 37–54)
HCT VFR BLD CALC: 43.6 % (ref 37–54)
HEMOGLOBIN: 14.7 G/DL (ref 12.5–16.5)
HEMOGLOBIN: 15.1 G/DL (ref 12.5–16.5)
IMMATURE GRANULOCYTES #: 0.04 E9/L
IMMATURE GRANULOCYTES #: 0.05 E9/L
IMMATURE GRANULOCYTES %: 0.5 % (ref 0–5)
IMMATURE GRANULOCYTES %: 0.6 % (ref 0–5)
KETONES, URINE: NEGATIVE MG/DL
LEUKOCYTE ESTERASE, URINE: ABNORMAL
LYMPHOCYTES ABSOLUTE: 1.28 E9/L (ref 1.5–4)
LYMPHOCYTES ABSOLUTE: 2.42 E9/L (ref 1.5–4)
LYMPHOCYTES RELATIVE PERCENT: 12.1 % (ref 20–42)
LYMPHOCYTES RELATIVE PERCENT: 38.3 % (ref 20–42)
MCH RBC QN AUTO: 29.8 PG (ref 26–35)
MCH RBC QN AUTO: 30.2 PG (ref 26–35)
MCHC RBC AUTO-ENTMCNC: 34.6 % (ref 32–34.5)
MCHC RBC AUTO-ENTMCNC: 34.7 % (ref 32–34.5)
MCV RBC AUTO: 86.2 FL (ref 80–99.9)
MCV RBC AUTO: 87.1 FL (ref 80–99.9)
METER GLUCOSE: 222 MG/DL (ref 74–99)
METER GLUCOSE: 238 MG/DL (ref 74–99)
MONOCYTES ABSOLUTE: 0.65 E9/L (ref 0.1–0.95)
MONOCYTES ABSOLUTE: 0.81 E9/L (ref 0.1–0.95)
MONOCYTES RELATIVE PERCENT: 10.3 % (ref 2–12)
MONOCYTES RELATIVE PERCENT: 7.6 % (ref 2–12)
NEUTROPHILS ABSOLUTE: 2.91 E9/L (ref 1.8–7.3)
NEUTROPHILS ABSOLUTE: 8.36 E9/L (ref 1.8–7.3)
NEUTROPHILS RELATIVE PERCENT: 46 % (ref 43–80)
NEUTROPHILS RELATIVE PERCENT: 78.8 % (ref 43–80)
NITRITE, URINE: POSITIVE
PDW BLD-RTO: 12.6 FL (ref 11.5–15)
PDW BLD-RTO: 13 FL (ref 11.5–15)
PH UA: 5.5 (ref 5–9)
PLATELET # BLD: 255 E9/L (ref 130–450)
PLATELET # BLD: 279 E9/L (ref 130–450)
PMV BLD AUTO: 10.5 FL (ref 7–12)
PMV BLD AUTO: 10.7 FL (ref 7–12)
POTASSIUM REFLEX MAGNESIUM: 4.5 MMOL/L (ref 3.5–5)
POTASSIUM SERPL-SCNC: 4.9 MMOL/L (ref 3.5–5)
PROTEIN UA: 30 MG/DL
RBC # BLD: 4.87 E12/L (ref 3.8–5.8)
RBC # BLD: 5.06 E12/L (ref 3.8–5.8)
RBC UA: ABNORMAL /HPF (ref 0–2)
SODIUM BLD-SCNC: 134 MMOL/L (ref 132–146)
SODIUM BLD-SCNC: 137 MMOL/L (ref 132–146)
SPECIFIC GRAVITY UA: 1.01 (ref 1–1.03)
TOTAL PROTEIN: 7.6 G/DL (ref 6.4–8.3)
TOTAL PROTEIN: 7.8 G/DL (ref 6.4–8.3)
UROBILINOGEN, URINE: 0.2 E.U./DL
WBC # BLD: 10.6 E9/L (ref 4.5–11.5)
WBC # BLD: 6.3 E9/L (ref 4.5–11.5)
WBC UA: ABNORMAL /HPF (ref 0–5)

## 2023-02-05 PROCEDURE — 85025 COMPLETE CBC W/AUTO DIFF WBC: CPT

## 2023-02-05 PROCEDURE — 87088 URINE BACTERIA CULTURE: CPT

## 2023-02-05 PROCEDURE — 82962 GLUCOSE BLOOD TEST: CPT

## 2023-02-05 PROCEDURE — 81001 URINALYSIS AUTO W/SCOPE: CPT

## 2023-02-05 PROCEDURE — G0378 HOSPITAL OBSERVATION PER HR: HCPCS

## 2023-02-05 PROCEDURE — 80053 COMPREHEN METABOLIC PANEL: CPT

## 2023-02-05 PROCEDURE — 6360000004 HC RX CONTRAST MEDICATION: Performed by: RADIOLOGY

## 2023-02-05 PROCEDURE — G0379 DIRECT REFER HOSPITAL OBSERV: HCPCS

## 2023-02-05 PROCEDURE — 88305 TISSUE EXAM BY PATHOLOGIST: CPT

## 2023-02-05 PROCEDURE — 74177 CT ABD & PELVIS W/CONTRAST: CPT

## 2023-02-05 PROCEDURE — 6370000000 HC RX 637 (ALT 250 FOR IP): Performed by: EMERGENCY MEDICINE

## 2023-02-05 PROCEDURE — 99283 EMERGENCY DEPT VISIT LOW MDM: CPT

## 2023-02-05 PROCEDURE — 1200000000 HC SEMI PRIVATE

## 2023-02-05 PROCEDURE — 6360000002 HC RX W HCPCS: Performed by: EMERGENCY MEDICINE

## 2023-02-05 PROCEDURE — 6370000000 HC RX 637 (ALT 250 FOR IP): Performed by: INTERNAL MEDICINE

## 2023-02-05 PROCEDURE — 87186 SC STD MICRODIL/AGAR DIL: CPT

## 2023-02-05 PROCEDURE — 36415 COLL VENOUS BLD VENIPUNCTURE: CPT

## 2023-02-05 PROCEDURE — 2580000003 HC RX 258: Performed by: EMERGENCY MEDICINE

## 2023-02-05 RX ORDER — CEFDINIR 300 MG/1
300 CAPSULE ORAL 2 TIMES DAILY
Qty: 14 CAPSULE | Refills: 0 | Status: SHIPPED | OUTPATIENT
Start: 2023-02-05 | End: 2023-02-12

## 2023-02-05 RX ORDER — GLIPIZIDE 5 MG/1
10 TABLET ORAL
Status: DISCONTINUED | OUTPATIENT
Start: 2023-02-06 | End: 2023-02-06 | Stop reason: HOSPADM

## 2023-02-05 RX ORDER — TAMSULOSIN HYDROCHLORIDE 0.4 MG/1
0.4 CAPSULE ORAL DAILY
Qty: 30 CAPSULE | Refills: 0 | Status: ON HOLD | OUTPATIENT
Start: 2023-02-05 | End: 2023-02-06 | Stop reason: HOSPADM

## 2023-02-05 RX ORDER — 0.9 % SODIUM CHLORIDE 0.9 %
1000 INTRAVENOUS SOLUTION INTRAVENOUS ONCE
Status: COMPLETED | OUTPATIENT
Start: 2023-02-05 | End: 2023-02-05

## 2023-02-05 RX ORDER — ASPIRIN 81 MG/1
81 TABLET ORAL DAILY
Status: DISCONTINUED | OUTPATIENT
Start: 2023-02-06 | End: 2023-02-06 | Stop reason: HOSPADM

## 2023-02-05 RX ORDER — FINASTERIDE 5 MG/1
5 TABLET, FILM COATED ORAL NIGHTLY
Status: DISCONTINUED | OUTPATIENT
Start: 2023-02-05 | End: 2023-02-06

## 2023-02-05 RX ORDER — TAMSULOSIN HYDROCHLORIDE 0.4 MG/1
0.4 CAPSULE ORAL DAILY
Status: DISCONTINUED | OUTPATIENT
Start: 2023-02-06 | End: 2023-02-06

## 2023-02-05 RX ORDER — FOLIC ACID 1 MG/1
1000 TABLET ORAL DAILY
Status: DISCONTINUED | OUTPATIENT
Start: 2023-02-06 | End: 2023-02-06 | Stop reason: HOSPADM

## 2023-02-05 RX ORDER — AMOXICILLIN 500 MG
1 CAPSULE ORAL DAILY
Status: DISCONTINUED | OUTPATIENT
Start: 2023-02-06 | End: 2023-02-05

## 2023-02-05 RX ORDER — INSULIN LISPRO 100 [IU]/ML
0-8 INJECTION, SOLUTION INTRAVENOUS; SUBCUTANEOUS
Status: DISCONTINUED | OUTPATIENT
Start: 2023-02-06 | End: 2023-02-06 | Stop reason: HOSPADM

## 2023-02-05 RX ORDER — INSULIN LISPRO 100 [IU]/ML
0-4 INJECTION, SOLUTION INTRAVENOUS; SUBCUTANEOUS NIGHTLY
Status: DISCONTINUED | OUTPATIENT
Start: 2023-02-05 | End: 2023-02-06 | Stop reason: HOSPADM

## 2023-02-05 RX ORDER — FOLIC ACID 1 MG/1
1 TABLET ORAL DAILY
COMMUNITY
Start: 2022-11-07

## 2023-02-05 RX ADMIN — FINASTERIDE 5 MG: 5 TABLET, FILM COATED ORAL at 23:45

## 2023-02-05 RX ADMIN — CEFTRIAXONE 1000 MG: 1 INJECTION, POWDER, FOR SOLUTION INTRAMUSCULAR; INTRAVENOUS at 01:58

## 2023-02-05 RX ADMIN — SODIUM CHLORIDE 1000 ML: 9 INJECTION, SOLUTION INTRAVENOUS at 01:55

## 2023-02-05 RX ADMIN — IOPAMIDOL 50 ML: 755 INJECTION, SOLUTION INTRAVENOUS at 15:37

## 2023-02-05 RX ADMIN — INSULIN HUMAN 10 UNITS: 100 INJECTION, SOLUTION PARENTERAL at 01:55

## 2023-02-05 ASSESSMENT — LIFESTYLE VARIABLES
HOW OFTEN DO YOU HAVE A DRINK CONTAINING ALCOHOL: NEVER
HOW MANY STANDARD DRINKS CONTAINING ALCOHOL DO YOU HAVE ON A TYPICAL DAY: PATIENT DOES NOT DRINK
HOW OFTEN DO YOU HAVE A DRINK CONTAINING ALCOHOL: NEVER
HOW MANY STANDARD DRINKS CONTAINING ALCOHOL DO YOU HAVE ON A TYPICAL DAY: PATIENT DOES NOT DRINK
HOW OFTEN DO YOU HAVE A DRINK CONTAINING ALCOHOL: NEVER

## 2023-02-05 ASSESSMENT — PAIN DESCRIPTION - PAIN TYPE: TYPE: ACUTE PAIN

## 2023-02-05 ASSESSMENT — PAIN DESCRIPTION - DESCRIPTORS
DESCRIPTORS: PRESSURE
DESCRIPTORS: PRESSURE

## 2023-02-05 ASSESSMENT — PAIN DESCRIPTION - LOCATION: LOCATION: HEAD

## 2023-02-05 ASSESSMENT — PAIN - FUNCTIONAL ASSESSMENT
PAIN_FUNCTIONAL_ASSESSMENT: NONE - DENIES PAIN
PAIN_FUNCTIONAL_ASSESSMENT: 0-10

## 2023-02-05 ASSESSMENT — PAIN SCALES - GENERAL
PAINLEVEL_OUTOF10: 3
PAINLEVEL_OUTOF10: 4

## 2023-02-05 NOTE — ED PROVIDER NOTES
[address] ENCOUNTER        Pt Name: Nasra Nugent  MRN: [id number]  [place] 1955  Date of evaluation: 2/5/2023  Provider: Ekaterina Brown DO  PCP: Sugar Ortega MD  Note Started: 8:11 AM EST 2/5/23    CHIEF COMPLAINT       Chief Complaint   Patient presents with    Other     PATIENT'S AREVALO NOT DRAINING       HISTORY OF PRESENT ILLNESS: 1 or more Elements   History From: Patient    Limitations to history : None    Nasra Nugent is a 75 y.o. male Past medical history of type 2 diabetes as well as BPH. Patient presents with chief complaint of Arevalo catheter dysfunction. Patient states that he was having issues with not been able to urinate for the past day or so. Patient states that he was seen in the emergency department earlier this morning and had a Arevalo catheter placed. Patient states that initially he was having good drainage. Patient states that since around 4:00 this morning he has not had any output from his Arevalo catheter. Patient does note some suprapubic fullness and need to urinate. Patient denies any exacerbating relieving factors. States that symptoms are moderate severity constant since onset. Patient states that he has had issues with urinary retention in the past.  Patient denies any fevers, chills, nausea, vomiting, chest pain, cough or shortness of breath. Nursing Notes were all reviewed and agreed with or any disagreements were addressed in the HPI. REVIEW OF SYSTEMS :           Positives and Pertinent negatives as per HPI. SURGICAL HISTORY   History reviewed. No pertinent surgical history.     CURRENTMEDICATIONS       Previous Medications    ASPIRIN 81 PO    Take 81 mg by mouth daily    CEFDINIR (OMNICEF) 300 MG CAPSULE    Take 1 capsule by mouth 2 times daily for 7 days    EMPAGLIFLOZIN (JARDIANCE) 25 MG TABLET    Take 25 mg by mouth daily    FINASTERIDE (PROSCAR) 5 MG TABLET    Take 5 mg by mouth nightly    GEMFIBROZIL (LOPID) 600 MG TABLET    Take 1 tablet by mouth 2 times daily    GLIMEPIRIDE (AMARYL) 2 MG TABLET    Take 2 tablets by mouth daily    METFORMIN (GLUCOPHAGE) 1000 MG TABLET    Take 1 tablet by mouth 2 times daily (with meals)    METHOTREXATE (RHEUMATREX) 2.5 MG CHEMO TABLET    Take 3 tablets by mouth    OMEGA-3 FATTY ACIDS (FISH OIL) 1200 MG CAPS    Take by mouth       ALLERGIES     Patient has no known allergies. FAMILYHISTORY     History reviewed. No pertinent family history. SOCIAL HISTORY       Social History     Tobacco Use    Smoking status: Never    Smokeless tobacco: Never   Substance Use Topics    Alcohol use: Not Currently    Drug use: Never       SCREENINGS        Brea Coma Scale  Eye Opening: Spontaneous  Best Verbal Response: Oriented  Best Motor Response: Obeys commands  Zuleima Coma Scale Score: 15                CIWA Assessment  BP: 118/70  Heart Rate: (!) 104           PHYSICAL EXAM  1 or more Elements     ED Triage Vitals   BP Temp Temp src Pulse Resp SpO2 Height Weight   -- -- -- -- -- -- -- --            Constitutional/General: Alert and oriented x3  Head: Normocephalic and atraumatic  Eyes: PERRL, EOMI, conjunctiva normal, sclera non icteric  ENT:  Oropharynx clear, handling secretions, no trismus, no asymmetry of the posterior oropharynx or uvular edema  Neck: Supple, full ROM, no stridor, no meningeal signs  Respiratory: Lungs clear to auscultation bilaterally, no wheezes, rales, or rhonchi. Not in respiratory distress  Cardiovascular:  Regular rate. Regular rhythm. No murmurs, no gallops, no rubs. 2+ distal pulses. Equal extremity pulses. Chest: No chest wall tenderness  GI:  Abdomen Soft, Non tender, Non distended. +BS. No rebound, guarding, or rigidity. No pulsatile masses. : Barraza catheter in place minimal drainage currently. Musculoskeletal: Moves all extremities x 4. Warm and well perfused, no clubbing, no cyanosis, no edema.  Capillary refill <3 seconds  Integument: skin warm and dry. No rashes. Neurologic: GCS 15, no focal deficits, symmetric strength 5/5 in the upper and lower extremities bilaterally  Psychiatric: Normal Affect            DIAGNOSTIC RESULTS   LABS:    Labs Reviewed - No data to display    As interpreted by me, the above displayed labs are abnormal. All other labs obtained during this visit were within normal range or not returned as of this dictation. No orders to display     No results found. No results found. PROCEDURES   Unless otherwise noted below, none          PAST MEDICAL HISTORY/Chronic Conditions Affecting Care      has a past medical history of Hyperlipidemia, Obesity (BMI 35.0-39.9 without comorbidity), and T2DM (type 2 diabetes mellitus) ([facility] [address] EMERGENCY DEPARTMENT COURSE    Vitals:    Vitals:    02/05/23 0811   BP: 118/70   Pulse: (!) 104   Resp: 16   Temp: 97.8 °F (36.6 °C)   SpO2: 97%   Weight: 200 lb (90.7 kg)   Height: 5' 7\" (1.702 m)       Patient was given the following medications:  Medications - No data to display        Is this patient to be included in the SEP-1 Core Measure due to severe sepsis or septic shock? No Exclusion criteria - the patient is NOT to be included for SEP-1 Core Measure due to: Infection is not suspected        Medical Decision Making/Differential Diagnosis:    CC/HPI Summary, Social Determinants of health, Records Reviewed, DDx, testing done/not done, ED Course, Reassessment, disposition considerations/shared decision making with patient, consults, disposition:          History from : Patient    Limitations to history : None    Chronic Conditions: Type 2 diabetes as well as BPH    CONSULTS: (Who and What was discussed)  None    Discussion with Other Profesionals : None    Social Determinants : None    Records Reviewed :  Outpatient Notes outpatient urology notes concerning BPH    CC/HPI Summary, DDx, ED Course, and Reassessment: Patient is a 79-year-old male past medical history of type 2 diabetes as well as BPH. Patient presents with chief complaint of Barraza malfunction. Patient states that he was seen earlier seen for urinary retention had Barraza catheter placed. Barraza has not been draining. Vital signs stable presentation. On physical exam heart regular rate and rhythm, lungs clear to auscultation bilaterally, abdomen soft with mild suprapubic fullness, no rigidity rebound or guarding. Barraza catheter is in place however minimal urine drainage noted. Laboratory work reviewed from earlier this evening. Barraza catheter was repositioned as well as flushed by nursing staff. Patient did have good return of yellow urine no gross hematuria noted. Finds consistent Barraza catheter dysfunction likely secondary to malpositioning. Patient is overall well-appearing Barraza catheter is now draining freely. Patient was previously called and antibiotics were added on Flomax. Patient was instructed to leave Barraza catheter in place until he follows up with primary care doctor as well as urology. In addition if patient notes any new worrisome symptoms he was instructed to return to the emergency department for further evaluation. Plan of care discussed with patient including discharge, all questions were answered, patient was in agreement plan of care and discharged home in stable condition. Disposition Considerations (Tests not ordered but considered, Shared Decision Making, Pt Expectation of Test or Tx.): Shared decision making discussed with patient. Laboratory work reviewed from last night. Patient's Barraza catheter was repositioned and flushed patient has yellow urine draining Barraza no gross hematuria noted. Patient appropriate for discharge and outpatient follow-up. FINAL IMPRESSION      1.  Barraza catheter problem, initial encounter [facility])          DISPOSITION/PLAN     DISPOSITION Decision To Discharge 02/05/2023 08:40:57 AM      PATIENT REFERRED TO:  Linda Banegas [address] [place] [address] [facility]  [address]  [phone]    Schedule an appointment as soon as possible for a visit       [address] [facility]  [facility]  [phone]  Go to       DO Grayson Pham [facility] [address]  [address]  [facility] [address]  [phone]          DISCHARGE MEDICATIONS:  New Prescriptions    TAMSULOSIN (FLOMAX) 0.4 MG CAPSULE    Take 1 capsule by mouth daily       DISCONTINUED MEDICATIONS:  Discontinued Medications    No medications on file              (Please note that portions of this note were completed with a voice recognition program.  Efforts were made to edit the dictations but occasionally words are mis-transcribed.)    Bonifacio Nathan DO (electronically signed)           Jude Rucker DO  Resident  02/05/23 4701

## 2023-02-05 NOTE — ED NOTES
Jaime leg bag change to dependent jaime bag as per requested by  NP  Pt was given water and dinner tray as per requested .     Patient ambulatory to and from [facility]  02/05/23 9853

## 2023-02-05 NOTE — ED NOTES
Pt reporting that this is the 3rd time he has been here today and having catheter problems.   Pt reporting here this time with peeing around the catheter , #16 jaime catheter inplace on arrival with balloon inflated with 10 ml sterile water, jaime patent at this time small amt yellow urine via catheter leg bag securred to L upper leg with jaime leg straps     Pablo Max RN  02/05/23 1639

## 2023-02-05 NOTE — DISCHARGE INSTRUCTIONS
Leave Barraza catheter in place until follow-up with urology  Follow-up with primary care doctor  If you notice any new worrisome symptoms please return to the emergency department for evaluation

## 2023-02-05 NOTE — ED PROVIDER NOTES
Department of Emergency Medicine   ED  Provider Note  Admit Date/RoomTime: 2/4/2023 11:43 PM  ED Room: 06/06          History of Present Illness:  2/5/23, Time: 12:07 AM EST  Chief Complaint   Patient presents with    Urinary Retention     Last urination approximately 2 hours PTA                Christopher Weston is a 75 y.o. male presenting to the ED for urinary retention, beginning 2 hours ago. The complaint has been persistent, moderate in severity, and worsened by nothing. Patient says that he had a urology procedure done in December. He states that he had a hypertrophied prostate and they opened it up. He says that he has had no issues since the operation. About 10:00 this evening he was not able to urinate. Says that he feels a fullness in the suprapubic region. He denies any fever, chills, nausea, vomiting. Review of Systems:   A complete review of systems was performed and pertinent positives and negatives are stated within HPI, all other systems reviewed and are negative.        --------------------------------------------- PAST HISTORY ---------------------------------------------  Past Medical History:  has a past medical history of Hyperlipidemia, Obesity (BMI 35.0-39.9 without comorbidity), and T2DM (type 2 [facility] Past Surgical History:  has no past surgical history on file. Social History:  reports that he has never smoked. He has never used smokeless tobacco. He reports that he does not currently use alcohol. He reports that he does not use drugs. Family History: family history is not on file. . Unless otherwise noted, family history is non contributory    The patients home medications have been reviewed. Allergies: Patient has no known allergies.     I have reviewed the past medical history, past surgical history, social history, and family history    ---------------------------------------------------PHYSICAL EXAM--------------------------------------    Constitutional/General: Alert and oriented x3  Head: Normocephalic and atraumatic  Eyes: PERRL, EOMI, sclera non icteric  ENT: Oropharynx clear, handling secretions, no trismus, no asymmetry of the posterior oropharynx or uvular edema  Neck: Supple, full ROM, no stridor, no meningeal signs  Respiratory: Lungs clear to auscultation bilaterally, no wheezes, rales, or rhonchi. Not in respiratory distress  Cardiovascular:  Regular rate. Regular rhythm. No murmurs, no gallops, no rubs. 2+ distal pulses. Equal extremity pulses. Gastrointestinal:  Abdomen Soft, suprapubic tenderness to palpation. Distended abdomen. No rebound, guarding, or rigidity. No pulsatile masses. Musculoskeletal: Moves all extremities x 4. Warm and well perfused, no clubbing, no cyanosis, no edema. Capillary refill <3 seconds  Skin: skin warm and dry. No rashes. Neurologic: GCS 15, no focal deficits, symmetric strength 5/5 in the upper and lower extremities bilaterally  Psychiatric: Normal Affect          -------------------------------------------------- RESULTS -------------------------------------------------  Results are listed below.      LABS: (Lab results interpreted by me)  Results for orders placed or performed during the hospital encounter of 02/04/23   CBC with Auto Differential   Result Value Ref Range    WBC 6.3 4.5 - 11.5 E9/L    RBC 5.06 3.80 - 5.80 E12/L    Hemoglobin 15.1 12.5 - 16.5 g/dL    Hematocrit 43.6 37.0 - 54.0 %    MCV 86.2 80.0 - 99.9 fL    MCH 29.8 26.0 - 35.0 pg    MCHC 34.6 (H) 32.0 - 34.5 %    RDW 12.6 11.5 - 15.0 fL    Platelets 988 363 - 182 E9/L    MPV 10.5 7.0 - 12.0 fL    Neutrophils % 46.0 43.0 - 80.0 %    Immature Granulocytes % 0.6 0.0 - 5.0 %    Lymphocytes % 38.3 20.0 - 42.0 %    Monocytes % 10.3 2.0 - 12.0 %    Eosinophils % 4.0 0.0 - 6.0 %    Basophils % 0.8 0.0 - 2.0 %    Neutrophils Absolute 2.91 1.80 - 7.30 E9/L    Immature Granulocytes # 0.04 E9/L Lymphocytes Absolute 2.42 1.50 - 4.00 E9/L    Monocytes Absolute 0.65 0.10 - 0.95 E9/L    Eosinophils Absolute 0.25 0.05 - 0.50 E9/L    Basophils Absolute 0.05 0.00 - 0.20 E9/L   CMP   Result Value Ref Range    Sodium 134 132 - 146 mmol/L    Potassium 4.9 3.5 - 5.0 mmol/L    Chloride 98 98 - 107 mmol/L    CO2 21 (L) 22 - 29 mmol/L    Anion Gap 15 7 - 16 mmol/L    Glucose 390 (H) 74 - 99 mg/dL    BUN 27 (H) 6 - 23 mg/dL    Creatinine 0.9 0.7 - 1.2 mg/dL    Est, Glom Filt Rate >60 >=60 mL/min/1.73    Calcium 9.6 8.6 - 10.2 mg/dL    Total Protein 7.8 6.4 - 8.3 g/dL    Albumin 4.2 3.5 - 5.2 g/dL    Total Bilirubin 0.6 0.0 - 1.2 mg/dL    Alkaline Phosphatase 83 40 - 129 U/L    ALT 22 0 - 40 U/L    AST 20 0 - 39 U/L   Urinalysis   Result Value Ref Range    Color, UA Yellow Straw/Yellow    Clarity, UA CLOUDY (A) Clear    Glucose, Ur >=1000 (A) Negative mg/dL    Bilirubin Urine Negative Negative    Ketones, Urine Negative Negative mg/dL    Specific Gravity, UA 1.010 1.005 - 1.030    Blood, Urine LARGE (A) Negative    pH, UA 5.5 5.0 - 9.0    Protein, UA 30 (A) Negative mg/dL    Urobilinogen, Urine 0.2 <2.0 E.U./dL    Nitrite, Urine POSITIVE (A) Negative    Leukocyte Esterase, Urine SMALL (A) Negative   Microscopic Urinalysis   Result Value Ref Range    WBC, UA 5-10 (A) 0 - 5 /HPF    RBC, UA 10-20 (A) 0 - 2 /HPF    Bacteria, UA MODERATE (A) None Seen /HPF   ,         ------------------------- NURSING NOTES AND VITALS REVIEWED ---------------------------   The nursing notes within the ED encounter and vital signs as below have been reviewed by myself  BP (!) 193/88   Pulse (!) 104   Temp 98.6 °F (37 °C) (Oral)   Resp 20   Ht 5' 7\" (1.702 m)   Wt 200 lb (90.7 kg)   SpO2 98%   BMI 31.32 kg/m²     Oxygen Saturation Interpretation: Normal    The patients available past medical records and past encounters were reviewed.         ------------------------------ ED COURSE/MEDICAL DECISION MAKING----------------------          I, Dr. Thiago Hoyt, am the primary provider of record        Medical Decision Making:     History obtained from the patient    External records- unable to view    Comorbidity -hyperlipidemia, obesity, type 2 diabetes    While not exhaustive, the following diagnoses and their severity were considered: Urinary retention, urinary tract infection    Independent interpretation of Laboratory tests by Kevin العراقي MD: UTI     Results for orders placed or performed during the hospital encounter of 02/04/23   CBC with Auto Differential   Result Value Ref Range    WBC 6.3 4.5 - 11.5 E9/L    RBC 5.06 3.80 - 5.80 E12/L    Hemoglobin 15.1 12.5 - 16.5 g/dL    Hematocrit 43.6 37.0 - 54.0 %    MCV 86.2 80.0 - 99.9 fL    MCH 29.8 26.0 - 35.0 pg    MCHC 34.6 (H) 32.0 - 34.5 %    RDW 12.6 11.5 - 15.0 fL    Platelets 186 961 - 561 E9/L    MPV 10.5 7.0 - 12.0 fL    Neutrophils % 46.0 43.0 - 80.0 %    Immature Granulocytes % 0.6 0.0 - 5.0 %    Lymphocytes % 38.3 20.0 - 42.0 %    Monocytes % 10.3 2.0 - 12.0 %    Eosinophils % 4.0 0.0 - 6.0 %    Basophils % 0.8 0.0 - 2.0 %    Neutrophils Absolute 2.91 1.80 - 7.30 E9/L    Immature Granulocytes # 0.04 E9/L    Lymphocytes Absolute 2.42 1.50 - 4.00 E9/L    Monocytes Absolute 0.65 0.10 - 0.95 E9/L    Eosinophils Absolute 0.25 0.05 - 0.50 E9/L    Basophils Absolute 0.05 0.00 - 0.20 E9/L   CMP   Result Value Ref Range    Sodium 134 132 - 146 mmol/L    Potassium 4.9 3.5 - 5.0 mmol/L    Chloride 98 98 - 107 mmol/L    CO2 21 (L) 22 - 29 mmol/L    Anion Gap 15 7 - 16 mmol/L    Glucose 390 (H) 74 - 99 mg/dL    BUN 27 (H) 6 - 23 mg/dL    Creatinine 0.9 0.7 - 1.2 mg/dL    Est, Glom Filt Rate >60 >=60 mL/min/1.73    Calcium 9.6 8.6 - 10.2 mg/dL    Total Protein 7.8 6.4 - 8.3 g/dL    Albumin 4.2 3.5 - 5.2 g/dL    Total Bilirubin 0.6 0.0 - 1.2 mg/dL    Alkaline Phosphatase 83 40 - 129 U/L    ALT 22 0 - 40 U/L    AST 20 0 - 39 U/L   Urinalysis   Result Value Ref Range    Color, UA Yellow Straw/Yellow Clarity, UA CLOUDY (A) Clear    Glucose, Ur >=1000 (A) Negative mg/dL    Bilirubin Urine Negative Negative    Ketones, Urine Negative Negative mg/dL    Specific Gravity, UA 1.010 1.005 - 1.030    Blood, Urine LARGE (A) Negative    pH, UA 5.5 5.0 - 9.0    Protein, UA 30 (A) Negative mg/dL    Urobilinogen, Urine 0.2 <2.0 E.U./dL    Nitrite, Urine POSITIVE (A) Negative    Leukocyte Esterase, Urine SMALL (A) Negative   Microscopic Urinalysis   Result Value Ref Range    WBC, UA 5-10 (A) 0 - 5 /HPF    RBC, UA 10-20 (A) 0 - 2 /HPF    Bacteria, UA MODERATE (A) None Seen /HPF         Are there any additional factors to consider that affect care (uninsured, homeless, illiterate, history from another source, etc.) (If yes, which ones). No      Name and Route of medications administered in the ED:  Medications   cefTRIAXone (ROCEPHIN) 1,000 mg in sterile water 10 mL IV syringe (has no administration in time range)           ED Course: This patient's ED course included: a personal history and physicial examination and re-evaluation prior to disposition    This patient has remained hemodynamically stable and improved during their ED course. Physical exam is remarkable for some suprapubic tenderness to palpation. Patient is unable to urinate. Barraza catheter was inserted. Patient is feeling much better after having the catheter placed. Labs obtained. White blood cell count is normal.  Glucose is elevated. Patient's anion gap is normal.  Patient was given 10 units of subcu insulin and fluids. Urinalysis shows signs of infection. Urine culture was obtained. Patient was given a gram of Rocephin while in the emergency department. I discussed the results with the patient. He feels comfortable with discharge home and following up with neurology outpatient. I told him I will start him on Omnicef.   I told him to take the antibiotic as directed, to follow-up with his primary care physician and urology for reevaluation this week.  Return precautions were discussed. Patient is agreeable with the plan of care and shared decision making. Re-Evaluations:       Feeling better after catheter inserted. Consultations:  none      Critical Care: none    Counseling: The emergency provider has spoken with the patient and discussed todays results, in addition to providing specific details for the plan of care and counseling regarding the diagnosis and prognosis. Questions are answered at this time and they are agreeable with the plan.       --------------------------------- IMPRESSION AND DISPOSITION ---------------------------------    IMPRESSION  1. Urinary retention    2. Urinary tract infection with hematuria, site unspecified        DISPOSITION  Disposition: Discharge to home  Patient condition is stable        NOTE: This report was transcribed using voice recognition software.  Every effort was made to ensure accuracy; however, inadvertent computerized transcription errors may be present        Ethyl MD Rolando  02/05/23 [id number]

## 2023-02-05 NOTE — ED NOTES
Jaime flushed with sterile water, urine flow restored. 450 ccs cloudy urine obtained. Leg bag placed and secured. Patient verbalized understanding of jaime care with a leg bag.       Eula Fair RN  02/05/23 1489

## 2023-02-05 NOTE — ED PROVIDER NOTES
Shared LINDA-ED Attending Visit. CC: No       [address] [facility]  Department of Emergency Medicine   ED  Encounter Note  Admit Date/RoomTime: 2023 12:20 PM  ED Room:     NAME: Marylee Sauers  : 1955  MRN: [id number]     Chief Complaint:  Other (States that urine is not going into the catheter tube)    History of Present Illness      Marylee Sauers is a 75 y.o. old male with a past medical history of:   Past Medical History:   Diagnosis Date    Hyperlipidemia     Obesity (BMI 35.0-39.9 without comorbidity)     T2DM (type 2 diabetes mellitus) ([facility]     presents to the emergency department from home, for evaluation of urethral jaime catheter for possible leaking or blockage of tube/line, which occured multiple times in the last 24 hours. He initially presented to the ER approximately 11:43 PM last evening with a 2-hour history of urinary retention. He reports that he had a urology procedure done in December due to a hypertrophied prostate and states it was \"Roto-Rooter\". He states that he has not since December had any difficulty voiding but last night was unable to pass any urine at all. He had a Jaime catheter placed at that time and was discharged home after 1 dose of Rocephin and was started on cefdinir 300 mg p.o. twice daily for a urinary tract infection. He represented earlier this morning and was seen by ER attending, the Jaime catheter was flushed at that time with no difficulty, the Jaime was repositioned and was draining well. Patient was discharged home to continue the cefdinir. ROS   Pertinent positives and negatives are stated within HPI, all other systems reviewed and are negative. Past Medical History:  has a past medical history of Hyperlipidemia, Obesity (BMI 35.0-39.9 without comorbidity), and T2DM (type 2 diabetes mellitus) ([facility] Surgical History:  has no past surgical history on file. Social History:  reports that he has never smoked.  He has never used smokeless tobacco. He reports that he does not currently use alcohol. He reports that he does not use drugs. Family History: family history is not on file. Allergies: Patient has no known allergies. Physical Exam   Oxygen Saturation Interpretation: Normal.        ED Triage Vitals [02/05/23 1217]   BP Temp Temp src Heart Rate Resp SpO2 Height Weight   134/74 97.8 °F (36.6 °C) -- (!) 106 16 98 % -- --         Constitutional:  Alert, development consistent with age. HEENT:  NC/NT. Airway patent. Neck:  Normal ROM. Supple. Respiratory:  Clear to auscultation and breath sounds equal.    CV: Regular rate and rhythm, normal heart sounds, without pathological murmurs, ectopy, gallops, or rubs. GI:  normal appearing, non-distended with no visible hernias. Bowel sounds: normal bowel sounds. Tenderness: No abdominal tenderness, guarding, rebound, rigidity or pulsatile mass. .        Liver: non-tender and non-palpable. Spleen:  non-tender and non-palpable. :  Barraza in place, Barraza is leaking around the penis. Please see procedure note below. After irrigation the photo below is a part of the particulate that was removed from patient's bladder. Integument:  No rashes, erythema present. Lymphatics: No lymphangitis or adenopathy noted. Neurological:  Oriented. Motor functions intact. Lab / Imaging Results   (All laboratory and radiology results have been personally reviewed by myself)  Labs:  No results found for this visit on 02/05/23. Imaging: All Radiology results interpreted by Radiologist unless otherwise noted.   CT ABDOMEN PELVIS W IV CONTRAST Additional Contrast? None    (Results Pending)     ED Course / Medical Decision Making   Medications - No data to display  ED Course as of 02/05/23 1858   Ana Segura Feb 05, 2023   1721 Spoke to Dr. Federico Buitrago regarding case, he is agreeable to excepting patient to [facility] for continuous bladder irrigation and further evaluation of the urinary outlet obstruction and the tissue coming from the Barraza catheter. [CS]      ED Course User Index  [CS] Deana Walsh, APRN - CNP     Consultations:         None    Procedures: Barraza repositioning and irrigation-the Barraza catheter balloon was deflated for 10 cc of sterile water, the Barraza catheter was cleansed and moved back into the bladder to the hub of the Barraza catheter, the balloon was then reinflated and repositioned. The Barraza catheter was irrigated with 250 cc of sterile saline, multiple large tissue pieces were removed from the bladder. Tissue pieces were sent for cytology. Barraza catheter is continuing to drain well with clear to cloudy yellow urine after irrigation. MDM:   This is a 57-year-old male with a history of type 2 diabetes, obesity, BPH with TURP performed at [facility] by Dr. Chris Bansal in December 2022. Last night he developed the inability to void which was sudden onset for him. He had a Barraza catheter placed in this ER and was found to have an acute cystitis with hematuria and was given 1 g of IV Rocephin and started on cefdinir. He returned home with a Barraza catheter in place, only to return this morning to the ER, ER attending at that time reposition the Barraza and irrigated the Barraza, there was good urine return. Patient was discharged home with a leg bag. He returns for a third time today at noon with leaking around the Barraza catheter with no drainage from the Barraza with increased bladder pressure. Differentials include acute cystitis with hematuria causing blood clots to occlude the Barraza catheter, malposition of the Barraza catheter. The Barraza catheter was repositioned by myself after careful cleaning, the catheter was irrigated copiously with 250 cc of saline, there was a large amount of tissue that does not appear to be blood clots and is concerning for possible bladder wall tissue that is coming from the catheter.   Given this I have ordered labs on the patient and did perform a CT abdomen pelvis to rule out bladder pathology. Following the irrigation the Barraza catheter did remain patent. However the CT abdomen pelvis was concerning for 6 thickened thickening of the bladder with gas within the central aspect of the prostate gland which could possibly related to the previous TURP however there are 2 new 1 cm lymph nodes anterior to the urinary bladder fundus which may be reactive metastatic in nature. In any case, I discussed with patient that he would need admission for possible three-way Barraza placement with continuous bladder irrigation and evaluation by urology. Patient is agreeable to this and is agreeable to admission to [facility].  The tissue obtained from the bladder irrigation was sent for cytology for better characterization of the tissue. Patient will be maintained on Rocephin 1 g IV every 24 hours for the acute cystitis. Patient's labs otherwise were unremarkable. Urinalysis was not repeated today as he had 1 less than 24 hours ago and is currently under treatment with a culture pending. Blood cultures were obtained today. Plan of Care/Counseling:  LAUREN Rodriguez CNP reviewed today's visit with the patient in addition to providing specific details for the plan of care and counseling regarding the diagnosis and prognosis. Questions are answered at this time and are agreeable with the plan. Assessment      1. Acute urinary obstruction    2. Acute cystitis with hematuria    3. Complicated UTI (urinary tract infection)      Plan   Transferred to [facility]. Patient condition is good    New Medications     New Prescriptions    No medications on file     Electronically signed by LAUREN Rodriguez CNP   DD: 2/5/23  **This report was transcribed using voice recognition software.  Every effort was made to ensure accuracy; however, inadvertent computerized transcription errors may be present.   END OF ED PROVIDER NOTE       LAUREN Valdes - CNP  02/05/23 [id number]

## 2023-02-06 VITALS
HEART RATE: 93 BPM | BODY MASS INDEX: 31.39 KG/M2 | SYSTOLIC BLOOD PRESSURE: 134 MMHG | TEMPERATURE: 97.7 F | WEIGHT: 200 LBS | OXYGEN SATURATION: 98 % | RESPIRATION RATE: 16 BRPM | DIASTOLIC BLOOD PRESSURE: 78 MMHG | HEIGHT: 67 IN

## 2023-02-06 PROBLEM — R33.9 URINARY RETENTION: Status: ACTIVE | Noted: 2023-02-06

## 2023-02-06 LAB
ALBUMIN SERPL-MCNC: 3.7 G/DL (ref 3.5–5.2)
ALP BLD-CCNC: 80 U/L (ref 40–129)
ALT SERPL-CCNC: 19 U/L (ref 0–40)
ANION GAP SERPL CALCULATED.3IONS-SCNC: 12 MMOL/L (ref 7–16)
AST SERPL-CCNC: 15 U/L (ref 0–39)
BILIRUB SERPL-MCNC: 1 MG/DL (ref 0–1.2)
BUN BLDV-MCNC: 14 MG/DL (ref 6–23)
CALCIUM SERPL-MCNC: 9.4 MG/DL (ref 8.6–10.2)
CHLORIDE BLD-SCNC: 102 MMOL/L (ref 98–107)
CO2: 23 MMOL/L (ref 22–29)
CREAT SERPL-MCNC: 0.8 MG/DL (ref 0.7–1.2)
GFR SERPL CREATININE-BSD FRML MDRD: >60 ML/MIN/1.73
GLUCOSE BLD-MCNC: 187 MG/DL (ref 74–99)
HCT VFR BLD CALC: 42.1 % (ref 37–54)
HEMOGLOBIN: 13.9 G/DL (ref 12.5–16.5)
MCH RBC QN AUTO: 29.6 PG (ref 26–35)
MCHC RBC AUTO-ENTMCNC: 33 % (ref 32–34.5)
MCV RBC AUTO: 89.8 FL (ref 80–99.9)
METER GLUCOSE: 217 MG/DL (ref 74–99)
METER GLUCOSE: 239 MG/DL (ref 74–99)
PDW BLD-RTO: 13 FL (ref 11.5–15)
PLATELET # BLD: 251 E9/L (ref 130–450)
PMV BLD AUTO: 11.3 FL (ref 7–12)
POTASSIUM SERPL-SCNC: 3.9 MMOL/L (ref 3.5–5)
RBC # BLD: 4.69 E12/L (ref 3.8–5.8)
SODIUM BLD-SCNC: 137 MMOL/L (ref 132–146)
TOTAL PROTEIN: 7.1 G/DL (ref 6.4–8.3)
WBC # BLD: 8.2 E9/L (ref 4.5–11.5)

## 2023-02-06 PROCEDURE — 82962 GLUCOSE BLOOD TEST: CPT

## 2023-02-06 PROCEDURE — 6370000000 HC RX 637 (ALT 250 FOR IP): Performed by: INTERNAL MEDICINE

## 2023-02-06 PROCEDURE — 2580000003 HC RX 258: Performed by: INTERNAL MEDICINE

## 2023-02-06 PROCEDURE — 80053 COMPREHEN METABOLIC PANEL: CPT

## 2023-02-06 PROCEDURE — 6360000002 HC RX W HCPCS: Performed by: INTERNAL MEDICINE

## 2023-02-06 PROCEDURE — 36415 COLL VENOUS BLD VENIPUNCTURE: CPT

## 2023-02-06 PROCEDURE — 96374 THER/PROPH/DIAG INJ IV PUSH: CPT

## 2023-02-06 PROCEDURE — 85027 COMPLETE CBC AUTOMATED: CPT

## 2023-02-06 PROCEDURE — G0378 HOSPITAL OBSERVATION PER HR: HCPCS

## 2023-02-06 RX ADMIN — ASPIRIN 81 MG: 81 TABLET, COATED ORAL at 10:00

## 2023-02-06 RX ADMIN — GLIPIZIDE 10 MG: 5 TABLET ORAL at 06:21

## 2023-02-06 RX ADMIN — WATER 1000 MG: 1 INJECTION INTRAMUSCULAR; INTRAVENOUS; SUBCUTANEOUS at 02:21

## 2023-02-06 RX ADMIN — FOLIC ACID 1000 MCG: 1 TABLET ORAL at 08:04

## 2023-02-06 RX ADMIN — METFORMIN HYDROCHLORIDE 1000 MG: 1000 TABLET ORAL at 08:01

## 2023-02-06 ASSESSMENT — PAIN SCALES - GENERAL: PAINLEVEL_OUTOF10: 0

## 2023-02-06 NOTE — CONSULTS
[address] [place] [address], [address] [place] [address]  [phone]    INPATIENT CONSULTATION RECORD FOR  2/6/2023      REASON FOR CONSULTATION:  BPH/Gross hematuria/Clot retention/UTI/Elevated PSA      HISTORY OF PRESENT ILLNESS:      The patient is a 75 y.o. male patient who is known to our practice. He has a history of chronic BPH and bladder outlet obstruction. He was managed initially with medical therapy with finasteride and Flomax which he failed. He is typically noncompliant with recommendations. He subsequently developed urinary retention. He underwent a TURP on 12/21/2022. This showed benign prostate tissue and a 23 g resection. He did well postoperatively. He was admitted over the weekend with blood clots. Scan yesterday shows a severely thickened bladder wall with a Barraza and evidence of cystitis. At the time of cystoscopy 6 weeks ago, there is no evidence of bladder tumor. His hematuria has cleared. Culture is pending. He feels better.   He denies any pain at this time      Past Medical History:   Diagnosis Date    Hyperlipidemia     Obesity (BMI 35.0-39.9 without comorbidity)     T2DM (type 2 diabetes mellitus) (HCC)    BPH  Elevated PSA    Past surgical History:  S/P TURP on 12/21/22    Medications Prior to Admission:    Medications Prior to Admission: cefdinir (OMNICEF) 300 MG capsule, Take 1 capsule by mouth 2 times daily for 7 days  tamsulosin (FLOMAX) 0.4 MG capsule, Take 1 capsule by mouth daily  folic acid (FOLVITE) 1 MG tablet, Take 1 tablet by mouth daily  methotrexate (RHEUMATREX) 2.5 MG chemo tablet, Take 3 tablets by mouth  glimepiride (AMARYL) 2 MG tablet, Take 2 tablets by mouth daily  empagliflozin (JARDIANCE) 25 MG tablet, Take 25 mg by mouth daily  gemfibrozil (LOPID) 600 MG tablet, Take 1 tablet by mouth 2 times daily  metFORMIN (GLUCOPHAGE) 1000 MG tablet, Take 1 tablet by mouth 2 times daily (with meals)  Omega-3 Fatty Acids (FISH OIL) 1200 MG CAPS, Take by mouth  finasteride (PROSCAR) 5 MG tablet, Take 5 mg by mouth nightly  ASPIRIN 81 PO, Take 81 mg by mouth daily    Allergies:    Patient has no known allergies. Social History:   He  reports that he has never smoked. He has never used smokeless tobacco. He reports that he does not currently use alcohol. He reports that he does not use drugs. He is single and has no children    Family History:   Non-contributory to this Urological problem  family history is not on file. REVIEW OF SYSTEMS:  Respiratory: denies any symptoms of a productive cough, shortness of breath, or hemoptysis  Cardiovascular: denies chest pain, dyspnea, or cardiac murmur  Gastrointestinal: negative for abdominal pain, diarrhea, or constipation  Dermatologic: denies any rashes, skin lesion(s), or pruritis  Neurological: negative for headaches, seizures, and tremors  Endocrine: negative for diabetic symptoms including polydipsia and polyuria or thyroid dysfunction  Ocular: denies retinopathy or glaucoma  ENT: denies sinusitis or epistaxis  Constitutional: denies nausea, vomiting, fever, or chills  Psychiatric: denies anxiety or depression  : denies dysuria or incontinence    PHYSICAL EXAM:    Vitals:  /61   Pulse 87   Temp 98.2 °F (36.8 °C) (Oral)   Resp 16   Ht 5' 7\" (1.702 m)   Wt 200 lb (90.7 kg)   SpO2 97%   BMI 31.32 kg/m²     General:  Awake, alert, oriented X 3. Well developed, well nourished, well groomed. No apparent distress. HEENT:  Normocephalic, atraumatic. Pupils equal, round. No scleral icterus. No conjunctival injection. Normal lips, teeth, and gums. No nasal discharge. Neck:  Supple, no masses. Heart:  RRR. Lungs:  No audible wheezing. Respirations symmetric and non-labored. Clear bilaterally. Abdomen:  Soft, nontender, no masses, no organomegaly, no peritoneal signs. Active bowel sounds. No rebound or guarding. No flank or CVA tenderness. Abdomen is obese  Extremities:  No clubbing, cyanosis, or edema.   Brisk peripheral pulses. Skin:  Warm and dry, no open lesions or rashes. Neuro:  Cranial nerves 2-12 intact, no focal motor or sensory deficits. Alert and oriented. Rectal: Deferred  Genitalia: Normal male genitalia. 16 Mongolian Barraza draining cloudy yellow urine into a gravity bag. Testes are normal bilaterally    LABS:    Lab Results   Component Value Date    WBC 8.2 02/06/2023    HGB 13.9 02/06/2023    HCT 42.1 02/06/2023    MCV 89.8 02/06/2023     02/06/2023       Lab Results   Component Value Date    BUN 14 02/06/2023    CREATININE 0.8 02/06/2023       Lab Results   Component Value Date    PSA 1.75 08/08/2022    PSA 1.52 07/28/2021    PSA 1.98 08/06/2018       ASSESSMENT / PLAN:    BPH/Gross hematuria/Clot retention/UTI/Elevated PSA  His imaging was reviewed. He has chronic bladder outlet obstruction from BPH. Cystoscopy 6 weeks ago showed no evidence of bladder pathology whatsoever. I do not suspect bladder cancer despite the report by the radiologist on the CT scan. He does however have a UTI. He will continue antibiotics. He does not need Flomax or Proscar any further and these will be discontinued. His catheter be irrigated to maintain patency. He may be discharged with a catheter to a leg bag. He will be given a voiding trial as an outpatient in about a week. He has had a chronically elevated PSA which is currently stable. Pathology from the TURP just before Christmas showed no evidence of prostate cancer. Avoid any anticoagulants if possible. You for allowing me to assist in his care once again.   Sincerely,      Justice Juan MD  7:28 AM  2/6/2023

## 2023-02-06 NOTE — PROGRESS NOTES
Barraza manually irrigated with 30 cc sterile water x 2. Cloudy yellow urine returned with large brown tissue like sediment present. Pt tolerated procedure well.

## 2023-02-06 NOTE — PLAN OF CARE
Problem: Discharge Planning  Goal: Discharge to home or other facility with appropriate resources  Outcome: Progressing  Flowsheets (Taken 2/5/2023 2109)  Discharge to home or other facility with appropriate resources:   Identify barriers to discharge with patient and caregiver   Arrange for needed discharge resources and transportation as appropriate   Identify discharge learning needs (meds, wound care, etc)     Problem: Safety - Adult  Goal: Free from fall injury  Outcome: Progressing     Problem: Pain  Goal: Verbalizes/displays adequate comfort level or baseline comfort level  Outcome: Progressing

## 2023-02-06 NOTE — CARE COORDINATION
Met with patient about diagnosis and discharge plan of care. Pt admit for urinary retention, recent cystoscopy 6 weeks ago. Pt lives alone in 2 story home. Bed and bath on 1st floor. Independent prior to admit. Jaime cath intact. Plan home with jaime. Will need irrigated at home. Spoke with pt, no preference to home care. Referral called to [facility]-await acceptance. Pt has aunt who is retired RN and can help. PCP is Dr Rita Rainey. Will follow-o    ADDEND: [place] [facility] care cannot accommodate. Referral called to [facility]. Will follow- AT [address]. HOME CARE ORDERS COMPLETED-O    The Plan for Transition of Care is related to the following treatment goals: home with home care     The Patient and/or patient representative pt was provided with a choice of provider and agrees   with the discharge plan. [x] Yes [] No    Freedom of choice list was provided with basic dialogue that supports the patient's individualized plan of care/goals, treatment preferences and shares the quality data associated with the providers.  [x] Yes [] No

## 2023-02-06 NOTE — PROGRESS NOTES
Education [address] [place] [address], taught by Jason Han RN at 2/6/2023  3:11 PM.  Learner: Patient  Readiness: Acceptance  Method: Explanation  Response: Verbalizes Understanding    Follow-up Appointments, taught by Jason Han RN at 2/6/2023  3:11 PM.  Learner: Patient  Readiness: Acceptance  Method: Explanation  Response: Silvestre Mcneil Understanding    When to Call the Doctor, taught by Jason Han RN at 2/6/2023  3:11 PM.  Learner: Patient  Readiness: Acceptance  Method: Explanation  Response: Silvestre Tarun Understanding    Review Discharge Plan, taught by Jason Han RN at 2/6/2023  3:11 PM.  Learner: Patient  Readiness: Acceptance  Method: Explanation  Response: Verbalizes Understanding    INSTRUCT  ON USE OF SAFETY DEVICES, taught by Jason Han RN at 2/6/2023  3:11 PM.  Learner: Patient  Readiness: Acceptance  Method: Explanation  Response: Verbalizes Understanding    Medication Safety, taught by Jason Han RN at 2/6/2023  3:11 PM.  Learner: Patient  Readiness: Acceptance  Method: Explanation  Response: Verbalizes Understanding    Fall Prevention, taught by Jason Han RN at 2/6/2023  3:11 PM.  Learner: Patient  Readiness: Acceptance  Method: Explanation  Response: Verbalizes Understanding    Day 1, taught by Jason Han RN at 2/6/2023  3:11 PM.  Learner: Patient  Readiness: Acceptance  Method: Explanation  Response: Verbalizes Understanding    Educate on intermittent urinary self-catheterization, taught by Jason Han RN at 2/6/2023  3:11 PM.  Learner: Patient  Readiness: Acceptance  Method: Explanation  Response: Verbalizes Understanding    Educate perineal skin care, taught by Jason Han RN at 2/6/2023  3:11 PM.  Learner: Patient  Readiness: Acceptance  Method: Explanation  Response: Verbalizes Understanding    Educate signs and symptoms of infection, taught by Jason Han RN at 2/6/2023  3:11 PM.  Learner: Patient  Readiness: Acceptance  Method: Explanation  Response: Verbalizes Understanding    Educate catheter care, taught by Katelynn Olivas RN at 2/6/2023  3:11 PM.  Learner: Patient  Readiness: Acceptance  Method: Explanation  Response: Verbalizes Understanding    Teach information regarding disease process or condition, taught by Katelynn Olivas RN at 2/6/2023  3:11 PM.  Learner: Patient  Readiness: Acceptance  Method: Explanation  Response: Verbalizes Understanding    Folic Acid, taught by Katelynn Olivas RN at 2/6/2023  9:40 AM.  Learner: Patient  Readiness: Acceptance  Method: Explanation  Response: Verbalizes Understanding    Aspirin, taught by Katelynn Olivas RN at 2/6/2023  9:40 AM.  Learner: Patient  Readiness: Acceptance  Method: Explanation  Response: Verbalizes Understanding    Finasteride, taught by Katelynn Olivas RN at 2/6/2023  9:40 AM.  Learner: Patient  Readiness: Acceptance  Method: Explanation  Response: Verbalizes Understanding    metFORMIN HCl, taught by Katelynn Olivas RN at 2/6/2023  9:40 AM.  Learner: Patient  Readiness: Acceptance  Method: Explanation  Response: Verbalizes Understanding    glipiZIDE, taught by Katelynn Olivas RN at 2/6/2023  9:40 AM.  Learner: Patient  Readiness: Acceptance  Method: Explanation  Response: Verbalizes Understanding    Insulin Lispro, taught by Katelynn Olivas RN at 2/6/2023  9:40 AM.  Learner: Patient  Readiness: Acceptance  Method: Explanation  Response: Verbalizes Understanding    cefTRIAXone Sodium, taught by Katelynn Olivas RN at 2/6/2023  9:40 AM.  Learner: Patient  Readiness: Acceptance  Method: Explanation  Response: Verbalizes Understanding    TECHNETIUM TC 99M SESTAMIBI - CARDIOLITE, taught by Katelynn Olivas RN at 2/6/2023  9:40 AM.  Learner: Patient  Readiness: Acceptance  Method: Explanation  Response: Verbalizes Understanding    Educate to safely dispose of unused pills, taught by Katelynn Olivas RN at 2/6/2023  9:40 AM.  Learner: Patient  Readiness: Acceptance  Method: Explanation  Response: Verbalizes Understanding    Educate not to share your medication, taught by Elier Monson RN at 2/6/2023  9:40 AM.  Learner: Patient  Readiness: Acceptance  Method: Explanation  Response: Verbalizes Understanding    Educate to keep opioids out of childrens reach, taught by Elier Monson RN at 2/6/2023  9:40 AM.  Learner: Patient  Readiness: Acceptance  Method: Explanation  Response: Verbalizes Understanding    Educate to keep opioids in their original package, taught by Elier Monson RN at 2/6/2023  9:40 AM.  Learner: Patient  Readiness: Acceptance  Method: Explanation  Response: Verbalizes Understanding    Educate to store opioids in a locked container, taught by Elier Monson RN at 2/6/2023  9:40 AM.  Learner: Patient  Readiness: Acceptance  Method: Explanation  Response: Verbalizes Understanding    Educate pharmacologic pain management, taught by Elier Monson RN at 2/6/2023  9:40 AM.  Learner: Patient  Readiness: Acceptance  Method: Explanation  Response: Verbalizes Understanding    Educate pain scale for assessing level of pain, taught by Elier Monson RN at 2/6/2023  9:40 AM.  Learner: Patient  Readiness: Acceptance  Method: Explanation  Response: Verbalizes Understanding    Educate potential side effects of all prescribed medications, taught by Elier Monson RN at 2/6/2023  9:40 AM.  Learner: Patient  Readiness: Acceptance  Method: Explanation  Response: Verbalizes Understanding    Educate notification to healthcare provider of episodes of pain, taught by Elier Monson RN at 2/6/2023  9:40 AM.  Learner: Patient  Readiness: Acceptance  Method: Explanation  Response: Verbalizes Understanding    Educate non-pharmacologic comfort measures, taught by Elier Monson RN at 2/6/2023  9:40 AM.  Learner: Patient  Readiness: Acceptance  Method: Explanation  Response: Verbalizes Understanding    Education Comments  No comments found. Discharge instructions given to patient. Medication administration, follow up appointments and special instructions discussed. Patient thankful for the care received.      Pt educated on jaime care and transitioned to leg bag for d/c to home

## 2023-02-06 NOTE — H&P
Paolo Taylor MD [facility]  Internal medicine   History and Physical      CHIEF COMPLAINT: Occluded Barraza catheter      HISTORY OF PRESENT ILLNESS:    Patient was seen at [address] earlier this morning  Spoke with the ER physician at the time of admission  Data reviewed in detail with the patient  79-year-old  man underwent some form of urological procedure for pulseless at [facility] for chronic urinary retention  I do not have the reports from that at this time  Apparently he did fine up until 2 days ago when he could not urinate  He went to [facility] emergency room where a Barraza cath was placed and he was discharged home  He presented back to the emergency room 3 times in the last 24 hours with occlusion of the catheter  He is now admitted for further assessment and management  CT of the bladder apparently showing abnormal findings  He denied fever chills or pain at this time    Past Medical History:    Past Medical History:   Diagnosis Date    Hyperlipidemia     Obesity (BMI 35.0-39.9 without comorbidity)     T2DM (type 2 diabetes mellitus) [facility])        Past Surgical History:    Recent urological procedure at [facility] before [facility]  History reviewed. No pertinent surgical history.     Medications Prior to Admission:    Medications Prior to Admission: cefdinir (OMNICEF) 300 MG capsule, Take 1 capsule by mouth 2 times daily for 7 days  tamsulosin (FLOMAX) 0.4 MG capsule, Take 1 capsule by mouth daily  folic acid (FOLVITE) 1 MG tablet, Take 1 tablet by mouth daily  methotrexate (RHEUMATREX) 2.5 MG chemo tablet, Take 3 tablets by mouth  glimepiride (AMARYL) 2 MG tablet, Take 2 tablets by mouth daily  empagliflozin (JARDIANCE) 25 MG tablet, Take 25 mg by mouth daily  gemfibrozil (LOPID) 600 MG tablet, Take 1 tablet by mouth 2 times daily  metFORMIN (GLUCOPHAGE) 1000 MG tablet, Take 1 tablet by mouth 2 times daily (with meals)  Omega-3 Fatty Acids (FISH OIL) 1200 MG CAPS, Take by mouth  finasteride (PROSCAR) 5 MG tablet, Take 5 mg by mouth nightly  ASPIRIN 81 PO, Take 81 mg by mouth daily    Allergies:    Patient has no known allergies. Social History:    reports that he has never smoked. He has never used smokeless tobacco. He reports that he does not currently use alcohol. He reports that he does not use drugs. Family History:   family history is not on file. REVIEW OF SYSTEMS:  As above in the HPI, otherwise negative    PHYSICAL EXAM:    Vitals:  /61   Pulse 87   Temp 98.2 °F (36.8 °C) (Oral)   Resp 16   Ht 5' 7\" (1.702 m)   Wt 200 lb (90.7 kg)   SpO2 97%   BMI 31.32 kg/m²     General:  Awake, alert, oriented X 3. Well developed, well nourished, well groomed. No apparent distress. HEENT:  Normocephalic, atraumatic. Pupils equal, round, reactive to light. No scleral icterus. No conjunctival injection. No nasal discharge. Neck:  Supple  Heart:  RRR, no murmurs, gallops, rubs  Lungs:  CTA bilaterally, bilat symmetrical expansion, no wheeze, rales, or rhonchi  Abdomen:   Bowel sounds present, soft, nontender, no masses, no organomegaly, no peritoneal signs  Extremities:  No clubbing, cyanosis, or edema  Skin:  Warm and dry, no open lesions   Scattered scaly lesions noted throughout suggestive of psoriasis  Neuro:  Cranial nerves 2-12 intact, no focal deficits  Breast: deferred  Rectal: deferred  Genitalia: Barraza catheter intact draining cloudy urine    LABS:  Data reviewed      ASSESSMENT:      Principal Problem:    Urinary retention  Recurrent Barraza catheter occlusion  Abnormal CT of the urinary bladder  Recent urological procedure at [facility]  Type 2 diabetes mellitus  Essential hypertension  Psoriasis  Hyperlipidemia      PLAN:  Urology consult  IV hydration  Empiric Rocephin  Questions answered to satisfaction  Check urine cultures    Berta Jordan MD  7:10 AM  2/6/2023

## 2023-02-06 NOTE — PROGRESS NOTES
Barraza manually irrigated with 30 cc sterile water x 3. Cloudy yellow urine returned with brown tissue like sediment present. Pt tolerated procedure well.

## 2023-02-06 NOTE — PLAN OF CARE
Problem: Discharge Planning  Goal: Discharge to home or other facility with appropriate resources  2/6/2023 0940 by Veronica Muñoz RN  Outcome: Progressing  2/5/2023 2237 by Christian Nixon RN  Outcome: Progressing  Flowsheets (Taken 2/5/2023 2109)  Discharge to home or other facility with appropriate resources:   Identify barriers to discharge with patient and caregiver   Arrange for needed discharge resources and transportation as appropriate   Identify discharge learning needs (meds, wound care, etc)     Problem: Safety - Adult  Goal: Free from fall injury  2/6/2023 0940 by Veronica Muñoz RN  Outcome: Progressing  2/5/2023 2237 by Christian Nixon RN  Outcome: Progressing     Problem: Pain  Goal: Verbalizes/displays adequate comfort level or baseline comfort level  2/6/2023 0940 by Veronica Muñoz RN  Outcome: Progressing  2/5/2023 2237 by Christian Nixon RN  Outcome: Progressing     Problem: Chronic Conditions and Co-morbidities  Goal: Patient's chronic conditions and co-morbidity symptoms are monitored and maintained or improved  Outcome: Progressing

## 2023-02-07 DIAGNOSIS — E11.9 DIABETES MELLITUS WITHOUT COMPLICATION (HCC): Primary | ICD-10-CM

## 2023-02-07 RX ORDER — GLIMEPIRIDE 4 MG/1
4 TABLET ORAL DAILY
Qty: 90 TABLET | Refills: 0 | Status: SHIPPED | OUTPATIENT
Start: 2023-02-07

## 2023-02-07 NOTE — TELEPHONE ENCOUNTER
Patient is requesting a refill of his Free Style Light test strips sent into [facility] in [place]. Sheila also wanted to make us aware he is having his catheter removed tomorrow by Dr. Rocael Oconnor and he has scheduled an appointment here for 2-10-23 @ 9:30. Thank you.

## 2023-02-08 LAB
ORGANISM: ABNORMAL
URINE CULTURE, ROUTINE: ABNORMAL

## 2023-02-10 ENCOUNTER — OFFICE VISIT (OUTPATIENT)
Dept: PRIMARY CARE CLINIC | Age: 68
End: 2023-02-10

## 2023-02-10 VITALS
BODY MASS INDEX: 32.65 KG/M2 | SYSTOLIC BLOOD PRESSURE: 125 MMHG | HEART RATE: 90 BPM | TEMPERATURE: 97.8 F | RESPIRATION RATE: 16 BRPM | WEIGHT: 208 LBS | DIASTOLIC BLOOD PRESSURE: 75 MMHG | OXYGEN SATURATION: 98 % | HEIGHT: 67 IN

## 2023-02-10 DIAGNOSIS — R33.8 BENIGN PROSTATIC HYPERPLASIA WITH URINARY RETENTION: ICD-10-CM

## 2023-02-10 DIAGNOSIS — Z90.79 S/P TURP: ICD-10-CM

## 2023-02-10 DIAGNOSIS — N40.1 BENIGN PROSTATIC HYPERPLASIA WITH URINARY RETENTION: ICD-10-CM

## 2023-02-10 DIAGNOSIS — E11.9 DIABETES MELLITUS WITHOUT COMPLICATION (HCC): Primary | ICD-10-CM

## 2023-02-10 SDOH — ECONOMIC STABILITY: INCOME INSECURITY: HOW HARD IS IT FOR YOU TO PAY FOR THE VERY BASICS LIKE FOOD, HOUSING, MEDICAL CARE, AND HEATING?: NOT HARD AT ALL

## 2023-02-10 SDOH — ECONOMIC STABILITY: FOOD INSECURITY: WITHIN THE PAST 12 MONTHS, YOU WORRIED THAT YOUR FOOD WOULD RUN OUT BEFORE YOU GOT MONEY TO BUY MORE.: NEVER TRUE

## 2023-02-10 SDOH — ECONOMIC STABILITY: HOUSING INSECURITY
IN THE LAST 12 MONTHS, WAS THERE A TIME WHEN YOU DID NOT HAVE A STEADY PLACE TO SLEEP OR SLEPT IN A SHELTER (INCLUDING NOW)?: NO

## 2023-02-10 SDOH — ECONOMIC STABILITY: FOOD INSECURITY: WITHIN THE PAST 12 MONTHS, THE FOOD YOU BOUGHT JUST DIDN'T LAST AND YOU DIDN'T HAVE MONEY TO GET MORE.: NEVER TRUE

## 2023-02-10 ASSESSMENT — PATIENT HEALTH QUESTIONNAIRE - PHQ9
SUM OF ALL RESPONSES TO PHQ QUESTIONS 1-9: 0
SUM OF ALL RESPONSES TO PHQ9 QUESTIONS 1 & 2: 0
1. LITTLE INTEREST OR PLEASURE IN DOING THINGS: 0
2. FEELING DOWN, DEPRESSED OR HOPELESS: 0
SUM OF ALL RESPONSES TO PHQ QUESTIONS 1-9: 0
1. LITTLE INTEREST OR PLEASURE IN DOING THINGS: 0
2. FEELING DOWN, DEPRESSED OR HOPELESS: 0
SUM OF ALL RESPONSES TO PHQ QUESTIONS 1-9: 0
SUM OF ALL RESPONSES TO PHQ9 QUESTIONS 1 & 2: 0
SUM OF ALL RESPONSES TO PHQ QUESTIONS 1-9: 0
SUM OF ALL RESPONSES TO PHQ QUESTIONS 1-9: 0
DEPRESSION UNABLE TO ASSESS: FUNCTIONAL CAPACITY MOTIVATION LIMITS ACCURACY

## 2023-02-10 ASSESSMENT — LIFESTYLE VARIABLES
HOW OFTEN DO YOU HAVE A DRINK CONTAINING ALCOHOL: NEVER
HOW MANY STANDARD DRINKS CONTAINING ALCOHOL DO YOU HAVE ON A TYPICAL DAY: PATIENT DOES NOT DRINK

## 2023-02-10 NOTE — PROGRESS NOTES
Dain Mota is a 75 y.o. male with the following history of HTN,DM BPH S/P TURP was in hospital for urinary retention her for follow up still has jaime going to see urology in couple days     Past Medical History:   Diagnosis Date    Hyperlipidemia     Obesity (BMI 35.0-39.9 without comorbidity)     T2DM (type 2 diabetes mellitus) ([facility] [address]        No past surgical history on file. No family history on file. Current Outpatient Medications:     glimepiride (AMARYL) 4 MG tablet, Take 1 tablet by mouth daily, Disp: 90 tablet, Rfl: 0    blood glucose test strips (ASCENSIA AUTODISC VI;ONE TOUCH ULTRA TEST VI) strip, 1 each by In Vitro route daily, Disp: 100 each, Rfl: 2    cefdinir (OMNICEF) 300 MG capsule, Take 1 capsule by mouth 2 times daily for 7 days, Disp: 14 capsule, Rfl: 0    folic acid (FOLVITE) 1 MG tablet, Take 1 tablet by mouth daily, Disp: , Rfl:     methotrexate (RHEUMATREX) 2.5 MG chemo tablet, Take 3 tablets by mouth, Disp: , Rfl:     empagliflozin (JARDIANCE) 25 MG tablet, Take 25 mg by mouth daily, Disp: , Rfl:     gemfibrozil (LOPID) 600 MG tablet, Take 1 tablet by mouth 2 times daily, Disp: 180 tablet, Rfl: 0    metFORMIN (GLUCOPHAGE) 1000 MG tablet, Take 1 tablet by mouth 2 times daily (with meals), Disp: 180 tablet, Rfl: 1    Omega-3 Fatty Acids (FISH OIL) 1200 MG CAPS, Take by mouth, Disp: , Rfl:     ASPIRIN 81 PO, Take 81 mg by mouth daily, Disp: , Rfl:      Allergies: Patient has no known allergies.     Social History     Tobacco Use    Smoking status: Never    Smokeless tobacco: Never   Substance Use Topics    Alcohol use: Not Currently        Review of Systems:  Respiratory: negative for cough and hemoptysis  Cardiovascular: negative for chest pain and dyspnea  Gastrointestinal: negative for abdominal pain, diarrhea, nausea and vomiting  Genitourinary:negative for dysuria and hematuria  Derm: negative for rash and skin lesion(s)  Neurological: negative for seizures and tremors  Endocrine: negative for diabetic symptoms including polydipsia and polyuria    Physical Exam:  Vitals:    02/10/23 0925   BP: 125/75   Pulse: 90   Resp: 16   Temp: 97.8 °F (36.6 °C)   SpO2: 98%      Wt Readings from Last 3 Encounters:   02/10/23 208 lb (94.3 kg)   02/05/23 200 lb (90.7 kg)   02/05/23 200 lb (90.7 kg)       Skin:  Warm and dry. No rash or bruises  HEENT:  PERRLA, EOMI  Neck:  No JVD, No thyromegaly, No carotid bruit  Cardiac:  RRR, No gallop or murmur  Lungs:  CTA, Normal percussion  Abdomen: Normal bowel sounds, no HSM, non-tender  Extremities:  No clubbing, edema or cyanosis  Neurological:  Moves all extremities. Lab Results   Component Value Date     02/06/2023    K 3.9 02/06/2023     02/06/2023    CO2 23 02/06/2023    BUN 14 02/06/2023    CREATININE 0.8 02/06/2023    GLUCOSE 187 (H) 02/06/2023    CALCIUM 9.4 02/06/2023    PROT 7.1 02/06/2023    LABALBU 3.7 02/06/2023    BILITOT 1.0 02/06/2023    ALKPHOS 80 02/06/2023    AST 15 02/06/2023    ALT 19 02/06/2023    LABGLOM >60 02/06/2023    GFRAA >60 07/28/2021     Lab Results   Component Value Date    WBC 8.2 02/06/2023    HGB 13.9 02/06/2023    HCT 42.1 02/06/2023    MCV 89.8 02/06/2023     02/06/2023     Lab Results   Component Value Date    CHOL 179 09/20/2017    TRIG 288 (H) 09/20/2017    HDL 32 07/28/2021    LDLCALC 77 07/28/2021    LABVLDL 65 07/28/2021     Lab Results   Component Value Date    PSA 1.75 08/08/2022    PSADIA 3.73 08/13/2012      Lab Results   Component Value Date    LABA1C 9.7 12/21/2021           Assessment and Plan:    Patient Active Problem List   Diagnosis    T2DM (type 2 diabetes mellitus) ([facility] [address]    Obesity (BMI 35.0-39.9 without comorbidity)    Precordial pain    COVID-19    Urinary retention       Diagnosis/Orders  1. Diabetes mellitus without complication (HCC)  -     POCT Glucose  2. Benign prostatic hyperplasia with urinary retention  3. S/P TURP    Return in about 3 months (around 5/10/2023).       Anil Salas Sandra Salomon MD

## 2023-03-03 RX ORDER — GEMFIBROZIL 600 MG/1
600 TABLET, FILM COATED ORAL 2 TIMES DAILY
Qty: 180 TABLET | Refills: 0 | Status: SHIPPED | OUTPATIENT
Start: 2023-03-03

## 2023-03-03 NOTE — TELEPHONE ENCOUNTER
Last Appointment:  2/10/2023  Future Appointments   Date Time Provider Aleshia Vicente   5/10/2023 10:00  Mercedes Clement  E [facility]

## 2023-03-24 ENCOUNTER — OFFICE VISIT (OUTPATIENT)
Dept: PRIMARY CARE CLINIC | Age: 68
End: 2023-03-24

## 2023-03-24 VITALS
DIASTOLIC BLOOD PRESSURE: 72 MMHG | HEIGHT: 67 IN | TEMPERATURE: 98.2 F | OXYGEN SATURATION: 98 % | SYSTOLIC BLOOD PRESSURE: 128 MMHG | WEIGHT: 215 LBS | RESPIRATION RATE: 16 BRPM | HEART RATE: 88 BPM | BODY MASS INDEX: 33.74 KG/M2

## 2023-03-24 DIAGNOSIS — E66.9 OBESITY (BMI 35.0-39.9 WITHOUT COMORBIDITY): ICD-10-CM

## 2023-03-24 DIAGNOSIS — K21.9 GASTROESOPHAGEAL REFLUX DISEASE WITHOUT ESOPHAGITIS: ICD-10-CM

## 2023-03-24 DIAGNOSIS — E78.2 MIXED HYPERLIPIDEMIA: ICD-10-CM

## 2023-03-24 DIAGNOSIS — E11.9 TYPE 2 DIABETES MELLITUS WITHOUT COMPLICATION, WITHOUT LONG-TERM CURRENT USE OF INSULIN (HCC): Primary | ICD-10-CM

## 2023-03-24 PROBLEM — E11.65 UNCONTROLLED TYPE 2 DIABETES MELLITUS WITH HYPERGLYCEMIA (HCC): Status: ACTIVE | Noted: 2023-03-24

## 2023-03-24 PROBLEM — E11.65 TYPE 2 DIABETES MELLITUS WITH HYPERGLYCEMIA (HCC): Status: ACTIVE | Noted: 2023-03-24

## 2023-03-24 RX ORDER — PANTOPRAZOLE SODIUM 40 MG/1
40 TABLET, DELAYED RELEASE ORAL
Qty: 30 TABLET | Refills: 3 | Status: SHIPPED | OUTPATIENT
Start: 2023-03-24

## 2023-03-24 NOTE — PROGRESS NOTES
35.0-39.9 without comorbidity)  3. Mixed hyperlipidemia  4. Cough most likely due to GERD   5. Gastroesophageal reflux disease without esophagitis     Will add protonic 40 mg qd     Return in about 3 months (around 6/24/2023).       Leola Lemos MD

## 2023-03-27 ENCOUNTER — TELEPHONE (OUTPATIENT)
Dept: PRIMARY CARE CLINIC | Age: 68
End: 2023-03-27

## 2023-03-27 NOTE — TELEPHONE ENCOUNTER
Pt called this AM stating even after the medication he was given on 3/24 his throat still hurts and his cough is getting worse.  He is requesting something stronger be sent to [facility] [phone]

## 2023-03-28 ENCOUNTER — TELEPHONE (OUTPATIENT)
Dept: PRIMARY CARE CLINIC | Age: 68
End: 2023-03-28

## 2023-03-28 RX ORDER — METHYLPREDNISOLONE 4 MG/1
TABLET ORAL
Qty: 1 KIT | Refills: 0 | Status: SHIPPED | OUTPATIENT
Start: 2023-03-28 | End: 2023-04-03

## 2023-03-31 ENCOUNTER — TELEPHONE (OUTPATIENT)
Dept: PRIMARY CARE CLINIC | Age: 68
End: 2023-03-31

## 2023-03-31 ENCOUNTER — HOSPITAL ENCOUNTER (OUTPATIENT)
Age: 68
End: 2023-03-31
Payer: MEDICARE

## 2023-03-31 ENCOUNTER — HOSPITAL ENCOUNTER (OUTPATIENT)
Dept: GENERAL RADIOLOGY | Age: 68
End: 2023-03-31
Payer: MEDICARE

## 2023-03-31 DIAGNOSIS — R05.3 CHRONIC COUGH: ICD-10-CM

## 2023-03-31 DIAGNOSIS — R05.3 CHRONIC COUGH: Primary | ICD-10-CM

## 2023-03-31 PROCEDURE — 71045 X-RAY EXAM CHEST 1 VIEW: CPT

## 2023-03-31 NOTE — TELEPHONE ENCOUNTER
Pt called and stated he still has a heavy chest, and a cough and would like something sent to [facility]

## 2023-04-04 NOTE — TELEPHONE ENCOUNTER
Pt called stating he hasn't received any call on his chest x-rays that he had done on 3/31. And that he is also still having a heavy cough.  Please advise

## 2023-04-05 RX ORDER — PREDNISONE 1 MG/1
5 TABLET ORAL DAILY
Qty: 14 TABLET | Refills: 0 | Status: SHIPPED | OUTPATIENT
Start: 2023-04-05 | End: 2023-04-19

## 2023-04-05 NOTE — TELEPHONE ENCOUNTER
Notified patient that xray was negative but Dr Shelby Perla is sending him prednisone 5 mg qd x 2 weeks to his pharmacy. Patient said thank you and he would get it in a bit from pharmacy.

## 2023-04-18 ENCOUNTER — TELEPHONE (OUTPATIENT)
Dept: PRIMARY CARE CLINIC | Age: 68
End: 2023-04-18

## 2023-05-17 NOTE — TELEPHONE ENCOUNTER
Patient requesting a refill of his glimepiride (AMARYL) 4 MG tablet be sent into [facility] in [place]. Thank you.

## 2023-05-19 RX ORDER — GLIMEPIRIDE 4 MG/1
4 TABLET ORAL DAILY
Qty: 90 TABLET | Refills: 0 | Status: SHIPPED | OUTPATIENT
Start: 2023-05-19

## 2023-06-20 RX ORDER — GEMFIBROZIL 600 MG/1
600 TABLET, FILM COATED ORAL 2 TIMES DAILY
Qty: 180 TABLET | Refills: 0 | Status: SHIPPED | OUTPATIENT
Start: 2023-06-20

## 2023-07-11 ENCOUNTER — TELEPHONE (OUTPATIENT)
Dept: FAMILY MEDICINE CLINIC | Age: 68
End: 2023-07-11

## 2023-07-11 NOTE — TELEPHONE ENCOUNTER
Gen Arias is asking if you can take him as a patient? You see a friend of his who has made progress reducing his cholesterol with your help. Please advise.

## 2023-07-20 LAB — PROSTATE SPECIFIC ANTIGEN: 1.36 NG/ML

## 2023-07-28 ENCOUNTER — OFFICE VISIT (OUTPATIENT)
Dept: PRIMARY CARE CLINIC | Age: 68
End: 2023-07-28

## 2023-07-28 VITALS
HEART RATE: 78 BPM | WEIGHT: 213 LBS | TEMPERATURE: 97.8 F | RESPIRATION RATE: 16 BRPM | BODY MASS INDEX: 33.43 KG/M2 | OXYGEN SATURATION: 98 % | HEIGHT: 67 IN | DIASTOLIC BLOOD PRESSURE: 80 MMHG | SYSTOLIC BLOOD PRESSURE: 130 MMHG

## 2023-07-28 DIAGNOSIS — L40.9 PSORIASIS: ICD-10-CM

## 2023-07-28 DIAGNOSIS — E78.2 MIXED HYPERLIPIDEMIA: ICD-10-CM

## 2023-07-28 DIAGNOSIS — E11.9 TYPE 2 DIABETES MELLITUS WITHOUT COMPLICATION, WITHOUT LONG-TERM CURRENT USE OF INSULIN (HCC): Primary | ICD-10-CM

## 2023-07-28 DIAGNOSIS — M25.522 LEFT ELBOW PAIN: ICD-10-CM

## 2023-07-28 DIAGNOSIS — E66.9 OBESITY (BMI 35.0-39.9 WITHOUT COMORBIDITY): ICD-10-CM

## 2023-07-28 NOTE — PROGRESS NOTES
Miranda Trujillo is a 75 y.o. male with the following history of DM,hyperlipidemia having pain burning left elbow for couple days no injury ,has not been checking his BS       Past Medical History:   Diagnosis Date    Hyperlipidemia     Obesity (BMI 35.0-39.9 without comorbidity)     T2DM (type 2 diabetes mellitus) ([address] )        No past surgical history on file. Family History   Problem Relation Age of Onset    No Known Problems Mother     No Known Problems Father              Current Outpatient Medications:     gemfibrozil (LOPID) 600 MG tablet, Take 1 tablet by mouth 2 times daily, Disp: 180 tablet, Rfl: 0    metFORMIN (GLUCOPHAGE) 1000 MG tablet, Take 1 tablet by mouth 2 times daily (with meals), Disp: 180 tablet, Rfl: 1    glimepiride (AMARYL) 4 MG tablet, Take 1 tablet by mouth daily, Disp: 90 tablet, Rfl: 0    pantoprazole (PROTONIX) 40 MG tablet, Take 1 tablet by mouth daily (with breakfast), Disp: 30 tablet, Rfl: 3    blood glucose test strips (ASCENSIA AUTODISC VI;ONE TOUCH ULTRA TEST VI) strip, 1 each by In Vitro route daily, Disp: 100 each, Rfl: 2    folic acid (FOLVITE) 1 MG tablet, Take 1 tablet by mouth daily, Disp: , Rfl:     methotrexate (RHEUMATREX) 2.5 MG chemo tablet, Take 3 tablets by mouth, Disp: , Rfl:     empagliflozin (JARDIANCE) 25 MG tablet, Take 1 tablet by mouth daily, Disp: , Rfl:     Omega-3 Fatty Acids (FISH OIL) 1200 MG CAPS, Take by mouth, Disp: , Rfl:     ASPIRIN 81 PO, Take 81 mg by mouth daily, Disp: , Rfl:      Allergies: Patient has no known allergies.     Social History     Tobacco Use    Smoking status: Never    Smokeless tobacco: Never   Substance Use Topics    Alcohol use: Not Currently        Review of Systems:  Respiratory: negative for cough and hemoptysis  Cardiovascular: negative for chest pain and dyspnea  Gastrointestinal: negative for abdominal pain, diarrhea, nausea and vomiting  Genitourinary:negative for dysuria and hematuria  Derm: negative for rash and skin

## 2023-08-02 ENCOUNTER — OFFICE VISIT (OUTPATIENT)
Dept: PRIMARY CARE CLINIC | Age: 68
End: 2023-08-02
Payer: MEDICARE

## 2023-08-02 VITALS
BODY MASS INDEX: 33.94 KG/M2 | TEMPERATURE: 97.1 F | WEIGHT: 216.25 LBS | HEART RATE: 84 BPM | OXYGEN SATURATION: 98 % | HEIGHT: 67 IN | DIASTOLIC BLOOD PRESSURE: 60 MMHG | SYSTOLIC BLOOD PRESSURE: 126 MMHG

## 2023-08-02 DIAGNOSIS — I49.9 CARDIAC ARRHYTHMIA, UNSPECIFIED CARDIAC ARRHYTHMIA TYPE: ICD-10-CM

## 2023-08-02 DIAGNOSIS — E55.9 VITAMIN D DEFICIENCY: ICD-10-CM

## 2023-08-02 DIAGNOSIS — E78.2 MIXED HYPERLIPIDEMIA: ICD-10-CM

## 2023-08-02 DIAGNOSIS — Z12.11 SCREENING FOR MALIGNANT NEOPLASM OF COLON: ICD-10-CM

## 2023-08-02 DIAGNOSIS — K21.9 GASTROESOPHAGEAL REFLUX DISEASE WITHOUT ESOPHAGITIS: ICD-10-CM

## 2023-08-02 DIAGNOSIS — E11.65 TYPE 2 DIABETES MELLITUS WITH HYPERGLYCEMIA, WITHOUT LONG-TERM CURRENT USE OF INSULIN (HCC): Primary | ICD-10-CM

## 2023-08-02 DIAGNOSIS — R53.83 OTHER FATIGUE: ICD-10-CM

## 2023-08-02 DIAGNOSIS — L40.9 PSORIASIS: ICD-10-CM

## 2023-08-02 DIAGNOSIS — N40.1 BENIGN PROSTATIC HYPERPLASIA WITH URINARY RETENTION: ICD-10-CM

## 2023-08-02 DIAGNOSIS — R33.8 BENIGN PROSTATIC HYPERPLASIA WITH URINARY RETENTION: ICD-10-CM

## 2023-08-02 PROCEDURE — 1123F ACP DISCUSS/DSCN MKR DOCD: CPT | Performed by: INTERNAL MEDICINE

## 2023-08-02 PROCEDURE — 99214 OFFICE O/P EST MOD 30 MIN: CPT | Performed by: INTERNAL MEDICINE

## 2023-08-02 PROCEDURE — 93000 ELECTROCARDIOGRAM COMPLETE: CPT | Performed by: INTERNAL MEDICINE

## 2023-08-02 RX ORDER — DOXYCYCLINE HYCLATE 50 MG/1
1 TABLET, FILM COATED ORAL 2 TIMES DAILY
COMMUNITY
Start: 2023-07-26

## 2023-08-02 ASSESSMENT — ENCOUNTER SYMPTOMS
NAUSEA: 0
SORE THROAT: 0
VOMITING: 0
EYE PAIN: 0
ABDOMINAL PAIN: 0
CONSTIPATION: 0
CHEST TIGHTNESS: 0
COUGH: 0
DIARRHEA: 0
BLOOD IN STOOL: 0
RHINORRHEA: 0
SHORTNESS OF BREATH: 0

## 2023-08-02 NOTE — PROGRESS NOTES
Caller: Margot Maurice    Relationship: Self    Best call back number: [phone]    Medication needed:   Requested Prescriptions     Pending Prescriptions Disp Refills   • amphetamine-dextroamphetamine (ADDERALL) 20 MG tablet 60 tablet 0     Sig: Take 1 tablet by mouth 2 (Two) Times a Day.       When do you need the refill by: ASAP      Does the patient have less than a 3 day supply:  [x] Yes  [] No    What is the patient's preferred pharmacy: [id number] - [address] AT [facility] [address] - [phone]  - [phone] FX             
LV 4-10  [place]   LF 6-12 #60 0 refills   ly today   Uds, contract ok   
Status:   Future     Standing Expiration Date:   8/2/2024    Vitamin D 25 Hydroxy     Standing Status:   Future     Standing Expiration Date:   8/2/2024    TSH     Standing Status:   Future     Standing Expiration Date:   8/2/2024    Urinalysis     Standing Status:   Future     Standing Expiration Date:   8/2/2024    Microalbumin, Ur     Standing Status:   Future     Standing Expiration Date:   8/2/2024    CBC with Auto Differential     Standing Status:   Future     Standing Expiration Date:   8/2/2024    [address] [place] [address] Diabetes [facility] [place]     Referral Priority:   Routine     Referral Type:   Eval and Treat     Referral Reason:   Specialty Services Required     Number of Visits Requested:   1    EKG 12 Lead     Order Specific Question:   Reason for Exam?     Answer:   Chest pain       Requested Prescriptions      No prescriptions requested or ordered in this encounter        Sydna Fabry, MD  8/2/2023  8:33 AM

## 2023-08-03 LAB
ALBUMIN SERPL-MCNC: 4.1 G/DL
ALP BLD-CCNC: 47 U/L
ALT SERPL-CCNC: 30 U/L
ANION GAP SERPL CALCULATED.3IONS-SCNC: ABNORMAL MMOL/L
AST SERPL-CCNC: 29 U/L
AVERAGE GLUCOSE: ABNORMAL
BASOPHILS ABSOLUTE: 40 /ΜL
BASOPHILS RELATIVE PERCENT: 0.8 %
BILIRUB SERPL-MCNC: 1 MG/DL (ref 0.1–1.4)
BUN BLDV-MCNC: 10 MG/DL
CALCIUM SERPL-MCNC: 9.1 MG/DL
CHLORIDE BLD-SCNC: 102 MMOL/L
CHOLESTEROL, FASTING: 200
CO2: 26 MMOL/L
CREAT SERPL-MCNC: 0.8 MG/DL
EGFR: 96
EOSINOPHILS ABSOLUTE: 160 /ΜL
EOSINOPHILS RELATIVE PERCENT: 3.2 %
GLUCOSE BLD-MCNC: 226 MG/DL
HBA1C MFR BLD: 9.4 %
HCT VFR BLD CALC: 45.9 % (ref 41–53)
HDLC SERPL-MCNC: 37 MG/DL (ref 35–70)
HEMOGLOBIN: 15.7 G/DL (ref 13.5–17.5)
LDL CHOLESTEROL CALCULATED: ABNORMAL
LYMPHOCYTES ABSOLUTE: 1795 /ΜL
LYMPHOCYTES RELATIVE PERCENT: 35.9 %
MAGNESIUM: 1.6 MG/DL
MCH RBC QN AUTO: 30.3 PG
MCHC RBC AUTO-ENTMCNC: 34.2 G/DL
MCV RBC AUTO: 88.6 FL
MONOCYTES ABSOLUTE: 410 /ΜL
MONOCYTES RELATIVE PERCENT: 8.2 %
NEUTROPHILS ABSOLUTE: 2595 /ΜL
NEUTROPHILS RELATIVE PERCENT: 51.9 %
PDW BLD-RTO: 12.8 %
PLATELET # BLD: 236 K/ΜL
PMV BLD AUTO: 10.8 FL
POTASSIUM SERPL-SCNC: 4.4 MMOL/L
RBC # BLD: 5.18 10^6/ΜL
SODIUM BLD-SCNC: 136 MMOL/L
TOTAL PROTEIN: 6.9
TRIGLYCERIDE, FASTING: 414
TSH SERPL DL<=0.05 MIU/L-ACNC: 1.88 UIU/ML
VITAMIN D 25-HYDROXY: 31
VITAMIN D2, 25 HYDROXY: NORMAL
VITAMIN D3,25 HYDROXY: NORMAL
WBC # BLD: 5 10^3/ML

## 2023-08-04 ENCOUNTER — TELEPHONE (OUTPATIENT)
Dept: PRIMARY CARE CLINIC | Age: 68
End: 2023-08-04

## 2023-08-04 DIAGNOSIS — E11.65 TYPE 2 DIABETES MELLITUS WITH HYPERGLYCEMIA, WITHOUT LONG-TERM CURRENT USE OF INSULIN (HCC): ICD-10-CM

## 2023-08-04 DIAGNOSIS — E78.2 MIXED HYPERLIPIDEMIA: Primary | ICD-10-CM

## 2023-08-04 DIAGNOSIS — E78.2 MIXED HYPERLIPIDEMIA: ICD-10-CM

## 2023-08-04 DIAGNOSIS — E55.9 VITAMIN D DEFICIENCY: ICD-10-CM

## 2023-08-04 DIAGNOSIS — R53.83 OTHER FATIGUE: ICD-10-CM

## 2023-08-04 NOTE — TELEPHONE ENCOUNTER
Please let the patient know that blood work results showed    Cholesterol levels were elevated. Specifically triglyceride levels were very elevated and would recommend to continue the gemfibrozil. Given diabetes diagnosis would recommend statin medication. Sugar was very elevated hemoglobin A1c is a measure of 3-month sugar control was very uncontrolled at 9.4. Goal of treatment would be 7 or less.   We will need to reevaluate current medications and will need to add additional medication or referral to endocrinology for insulin consideration    Electrolytes including magnesium, liver functions, and kidney function values were normal    Thyroid levels were normal    Blood counts were normal    Vitamin D level was normal but on the low end of normal    Thanks

## 2023-08-07 RX ORDER — ROSUVASTATIN CALCIUM 10 MG/1
10 TABLET, COATED ORAL DAILY
Qty: 30 TABLET | Refills: 5 | Status: SHIPPED | OUTPATIENT
Start: 2023-08-07

## 2023-08-07 NOTE — TELEPHONE ENCOUNTER
Requested Prescriptions     Signed Prescriptions Disp Refills    rosuvastatin (CRESTOR) 10 MG tablet 30 tablet 5     Sig: Take 1 tablet by mouth daily     Authorizing Provider: Eyal Cason     Medication sent to pharmacy    Will need to discuss at office appointment medication adjustments regarding diabetes and cholesterol at his next appointment

## 2023-09-06 ENCOUNTER — OFFICE VISIT (OUTPATIENT)
Dept: PRIMARY CARE CLINIC | Age: 68
End: 2023-09-06
Payer: MEDICARE

## 2023-09-06 VITALS
OXYGEN SATURATION: 98 % | DIASTOLIC BLOOD PRESSURE: 58 MMHG | HEIGHT: 67 IN | BODY MASS INDEX: 33.67 KG/M2 | TEMPERATURE: 97.3 F | HEART RATE: 77 BPM | WEIGHT: 214.5 LBS | SYSTOLIC BLOOD PRESSURE: 128 MMHG

## 2023-09-06 DIAGNOSIS — E11.65 TYPE 2 DIABETES MELLITUS WITH HYPERGLYCEMIA, WITHOUT LONG-TERM CURRENT USE OF INSULIN (HCC): ICD-10-CM

## 2023-09-06 DIAGNOSIS — E78.2 MIXED HYPERLIPIDEMIA: Primary | ICD-10-CM

## 2023-09-06 PROCEDURE — 99213 OFFICE O/P EST LOW 20 MIN: CPT | Performed by: INTERNAL MEDICINE

## 2023-09-06 PROCEDURE — 3046F HEMOGLOBIN A1C LEVEL >9.0%: CPT | Performed by: INTERNAL MEDICINE

## 2023-09-06 PROCEDURE — 1123F ACP DISCUSS/DSCN MKR DOCD: CPT | Performed by: INTERNAL MEDICINE

## 2023-09-06 RX ORDER — VALACYCLOVIR HYDROCHLORIDE 1 G/1
1000 TABLET, FILM COATED ORAL 2 TIMES DAILY
COMMUNITY
Start: 2023-08-28

## 2023-09-06 RX ORDER — M-VIT,TX,IRON,MINS/CALC/FOLIC 27MG-0.4MG
1 TABLET ORAL DAILY
COMMUNITY

## 2023-09-06 ASSESSMENT — ENCOUNTER SYMPTOMS
ABDOMINAL PAIN: 0
BLOOD IN STOOL: 0
RHINORRHEA: 0
DIARRHEA: 0
VOMITING: 0
SORE THROAT: 0
EYE PAIN: 0
NAUSEA: 0
CONSTIPATION: 0
SHORTNESS OF BREATH: 0
CHEST TIGHTNESS: 0
COUGH: 0

## 2023-09-06 NOTE — PROGRESS NOTES
[facility]) Physicians   Internal Medicine     2023  Nellie Strauss : 1955 Sex: male  Age:72 y.o. Chief Complaint   Patient presents with    Abdominal Pain     Patient reports stomach irritation that started last night but has passed. He states it was something he ate. HPI:   Patient presents to office for evaluation of the following medical concerns. -  has been having abdo pain. States generalized. States had bowel movement which had helped. No constipation,. States takes miralax. - States has diabetes. On metformin, On glimepiride. On medication list was jardiance states uncertain if ever took that medication. Last A1c was 9.4 (2023)     - States has high cholesterol. States has high triglycerides. On gemfibrozil. States uncertain if has tried statins.     -  has psoriasis. States follows with dermatology (Dr. Shandra Lainez). Was placed on methotrexate and folic acid, no longer taking.     -  has acid reflux. No longer on medications. Previously was on pantoprazole. -  has had BPH and urine retention. [facility] has seen urology. [facility] had a procedure TURP (2022). [facility] follow up (2023)     Health Maintenance   - immunizations:   Influenza Vaccination ()   Pneumonia Vaccination () - prevnar 13, () - pneumococcal 23   Zoster/Shingles Vaccine - (10/2021) #1, (2022) #2 - shingrix   Tetanus Vaccination () - tdap   Covid (2021) #1, (3/2021) #2, (1/3/2022) #3, (2022) #4    - Screenings:   Colonoscopy   FIT (2021) - neg   Prostate     ROS:  Review of Systems   Constitutional:  Negative for appetite change, chills, fever and unexpected weight change. HENT:  Negative for congestion, rhinorrhea and sore throat. Eyes:  Negative for pain and visual disturbance. Respiratory:  Negative for cough, chest tightness and shortness of breath. Cardiovascular:  Negative for chest pain and palpitations.    Gastrointestinal:  Negative for

## 2023-09-08 RX ORDER — GLIMEPIRIDE 4 MG/1
4 TABLET ORAL DAILY
Qty: 90 TABLET | Refills: 0 | Status: SHIPPED | OUTPATIENT
Start: 2023-09-08

## 2023-10-20 RX ORDER — GEMFIBROZIL 600 MG/1
600 TABLET, FILM COATED ORAL 2 TIMES DAILY
Qty: 180 TABLET | Refills: 0 | Status: SHIPPED | OUTPATIENT
Start: 2023-10-20

## 2023-10-20 NOTE — TELEPHONE ENCOUNTER
Pt chart had Dr. Enid Chavez listed as his PCP instead of Dr. Tim Wallace. I did make pt aware and he said that his family doctor is still Dr. Tim Wallace and that Enid Chavez was just a specialist he was referred to. He does need 2 refills from our office sent to [address] [place] [address].   Gemfibrozil 600MG  Metformin 1000MG

## 2023-11-08 ENCOUNTER — OFFICE VISIT (OUTPATIENT)
Dept: PRIMARY CARE CLINIC | Age: 68
End: 2023-11-08
Payer: MEDICARE

## 2023-11-08 VITALS
DIASTOLIC BLOOD PRESSURE: 60 MMHG | BODY MASS INDEX: 34.37 KG/M2 | TEMPERATURE: 98.7 F | OXYGEN SATURATION: 98 % | HEIGHT: 67 IN | RESPIRATION RATE: 16 BRPM | WEIGHT: 219 LBS | HEART RATE: 79 BPM | SYSTOLIC BLOOD PRESSURE: 118 MMHG

## 2023-11-08 DIAGNOSIS — E11.65 TYPE 2 DIABETES MELLITUS WITH HYPERGLYCEMIA, WITHOUT LONG-TERM CURRENT USE OF INSULIN (HCC): ICD-10-CM

## 2023-11-08 DIAGNOSIS — E78.2 MIXED HYPERLIPIDEMIA: ICD-10-CM

## 2023-11-08 DIAGNOSIS — J39.2 DRY THROAT: Primary | ICD-10-CM

## 2023-11-08 PROCEDURE — 99213 OFFICE O/P EST LOW 20 MIN: CPT | Performed by: INTERNAL MEDICINE

## 2023-11-08 PROCEDURE — 1123F ACP DISCUSS/DSCN MKR DOCD: CPT | Performed by: INTERNAL MEDICINE

## 2023-11-08 PROCEDURE — 3046F HEMOGLOBIN A1C LEVEL >9.0%: CPT | Performed by: INTERNAL MEDICINE

## 2023-11-08 ASSESSMENT — ENCOUNTER SYMPTOMS
NAUSEA: 0
COUGH: 0
VOMITING: 0
ABDOMINAL PAIN: 0
SHORTNESS OF BREATH: 0
DIARRHEA: 0

## 2023-11-28 LAB
ALBUMIN SERPL-MCNC: 4.4 G/DL
ALP BLD-CCNC: 48 U/L
ALT SERPL-CCNC: 23 U/L
ANION GAP SERPL CALCULATED.3IONS-SCNC: ABNORMAL MMOL/L
AST SERPL-CCNC: 20 U/L
AVERAGE GLUCOSE: ABNORMAL
BASOPHILS ABSOLUTE: 60 /ΜL
BASOPHILS RELATIVE PERCENT: 0.8 %
BILIRUB SERPL-MCNC: 0.9 MG/DL (ref 0.1–1.4)
BUN BLDV-MCNC: 15 MG/DL
CALCIUM SERPL-MCNC: 9.5 MG/DL
CHLORIDE BLD-SCNC: 102 MMOL/L
CHOLESTEROL, FASTING: 111
CO2: 23 MMOL/L
CREAT SERPL-MCNC: 0.81 MG/DL
EGFR: 95
EOSINOPHILS ABSOLUTE: 293 /ΜL
EOSINOPHILS RELATIVE PERCENT: 3.9 %
GLUCOSE BLD-MCNC: 259 MG/DL
HBA1C MFR BLD: 9.3 %
HCT VFR BLD CALC: 46.4 % (ref 41–53)
HDLC SERPL-MCNC: 45 MG/DL (ref 35–70)
HEMOGLOBIN: 15.8 G/DL (ref 13.5–17.5)
LDL CHOLESTEROL CALCULATED: 40 MG/DL (ref 0–160)
LYMPHOCYTES ABSOLUTE: 2438 /ΜL
LYMPHOCYTES RELATIVE PERCENT: 32.5 %
MAGNESIUM: 1.6 MG/DL
MCH RBC QN AUTO: 30 PG
MCHC RBC AUTO-ENTMCNC: 34.1 G/DL
MCV RBC AUTO: 88 FL
MONOCYTES ABSOLUTE: 570 /ΜL
MONOCYTES RELATIVE PERCENT: 7.6 %
NEUTROPHILS ABSOLUTE: 4140 /ΜL
NEUTROPHILS RELATIVE PERCENT: 55.2 %
PDW BLD-RTO: 11.9 %
PLATELET # BLD: 234 K/ΜL
PMV BLD AUTO: 11.3 FL
POTASSIUM SERPL-SCNC: 4.5 MMOL/L
RBC # BLD: 5.27 10^6/ΜL
SODIUM BLD-SCNC: 136 MMOL/L
TOTAL PROTEIN: 7.1
TRIGLYCERIDE, FASTING: 185
WBC # BLD: 7.5 10^3/ML

## 2023-11-29 DIAGNOSIS — E78.2 MIXED HYPERLIPIDEMIA: ICD-10-CM

## 2023-11-29 DIAGNOSIS — E11.65 TYPE 2 DIABETES MELLITUS WITH HYPERGLYCEMIA, WITHOUT LONG-TERM CURRENT USE OF INSULIN (HCC): ICD-10-CM

## 2023-11-30 ENCOUNTER — TELEPHONE (OUTPATIENT)
Dept: FAMILY MEDICINE CLINIC | Age: 68
End: 2023-11-30

## 2023-12-01 NOTE — TELEPHONE ENCOUNTER
Please let the patient know that blood work results showed    Sugar was elevated. Hemoglobin A1c is a measure of 3-month sugar control was still elevated and uncontrolled and essentially unchanged when compared to previous at 9.3. Will need to reevaluate medications. Recommending to follow low carbohydrate type diet and consideration for diabetes education    Cholesterol levels were borderline but much improved when compared to previous. Triglyceride levels were still elevated but also much improved when compared to previous    Blood counts were normal    Electrolytes including magnesium, liver functions, and kidney function values were normal    Please let the patient know that upon chart review it was noted that patient considers PCP as Dr. Cande Mckee.   I am also a PCP and not considered a specialist.  Patient would have to decide whom wished to continue seeing as a PCP    Thanks

## 2023-12-06 ENCOUNTER — OFFICE VISIT (OUTPATIENT)
Dept: PRIMARY CARE CLINIC | Age: 68
End: 2023-12-06
Payer: MEDICARE

## 2023-12-06 VITALS
OXYGEN SATURATION: 97 % | SYSTOLIC BLOOD PRESSURE: 122 MMHG | DIASTOLIC BLOOD PRESSURE: 60 MMHG | TEMPERATURE: 97.7 F | HEIGHT: 67 IN | BODY MASS INDEX: 34.53 KG/M2 | HEART RATE: 94 BPM | WEIGHT: 220 LBS

## 2023-12-06 DIAGNOSIS — E11.65 TYPE 2 DIABETES MELLITUS WITH HYPERGLYCEMIA, WITHOUT LONG-TERM CURRENT USE OF INSULIN (HCC): ICD-10-CM

## 2023-12-06 DIAGNOSIS — L40.9 PSORIASIS: ICD-10-CM

## 2023-12-06 DIAGNOSIS — R53.83 OTHER FATIGUE: ICD-10-CM

## 2023-12-06 DIAGNOSIS — E78.2 MIXED HYPERLIPIDEMIA: ICD-10-CM

## 2023-12-06 DIAGNOSIS — E55.9 VITAMIN D DEFICIENCY: ICD-10-CM

## 2023-12-06 DIAGNOSIS — N40.1 BENIGN PROSTATIC HYPERPLASIA WITH URINARY RETENTION: ICD-10-CM

## 2023-12-06 DIAGNOSIS — R33.8 BENIGN PROSTATIC HYPERPLASIA WITH URINARY RETENTION: ICD-10-CM

## 2023-12-06 DIAGNOSIS — K21.9 GASTROESOPHAGEAL REFLUX DISEASE WITHOUT ESOPHAGITIS: ICD-10-CM

## 2023-12-06 DIAGNOSIS — M79.644 THUMB PAIN, RIGHT: Primary | ICD-10-CM

## 2023-12-06 DIAGNOSIS — Z79.899 LONG TERM CURRENT USE OF THERAPEUTIC DRUG: ICD-10-CM

## 2023-12-06 PROCEDURE — 3046F HEMOGLOBIN A1C LEVEL >9.0%: CPT | Performed by: INTERNAL MEDICINE

## 2023-12-06 PROCEDURE — 99213 OFFICE O/P EST LOW 20 MIN: CPT | Performed by: INTERNAL MEDICINE

## 2023-12-06 PROCEDURE — 1123F ACP DISCUSS/DSCN MKR DOCD: CPT | Performed by: INTERNAL MEDICINE

## 2023-12-06 RX ORDER — DEUCRAVACITINIB 6 MG/1
TABLET, FILM COATED ORAL
COMMUNITY

## 2023-12-06 ASSESSMENT — ENCOUNTER SYMPTOMS
RHINORRHEA: 0
SHORTNESS OF BREATH: 0
SORE THROAT: 0
CHEST TIGHTNESS: 0
VOMITING: 0
DIARRHEA: 0
NAUSEA: 0
BLOOD IN STOOL: 0
ABDOMINAL PAIN: 0
COUGH: 0
CONSTIPATION: 0
EYE PAIN: 0

## 2023-12-06 NOTE — PROGRESS NOTES
[address] Physicians   Internal Medicine     2023  Nellie Strauss : 1955 Sex: male  Age:75 y.o. Chief Complaint   Patient presents with    Discuss Medications     Amount of vitamin D    Discuss Labs        HPI:   Patient presents to office for evaluation of the following medical concerns. -  has thumb pain. Right sided. -  has diabetes. On metformin, On glimepiride. Stopped jardiance due to stomach aches and cramps. Last A1c was 9.3 (2023). Did not schedule with diabetes education. Discussed referral to endocrinology.     -  has high cholesterol.  has high triglycerides. On gemfibrozil. States uncertain if has tried statins.     -  has psoriasis. [facility] (Dr. Shandra Lainez). Was placed on methotrexate and folic acid, no longer taking.  was started on sotyku (deucravacitinib).  has been taking for last 1 months. May be helping.     -  has acid reflux. No longer on medications. Previously was on pantoprazole. -  has had BPH and urine retention. [facility]. [facility] had [facility] T[facility]P (2022). hospitals follow up (2023)     Health Maintenance   - immunizations:   Influenza Vaccination (), ()   Pneumonia Vaccination () - prevnar 13, () - pneumococcal 23   Zoster/Shingles Vaccine - (10/2021) #1, (2022) #2 - shingrix   Tetanus Vaccination () - tdap   Covid (2021) #1, (3/2021) #2, (1/3/2022) #3, (2022) #4    - Screenings:   Colonoscopy   FIT (2021) - neg   Prostate     ROS:  Review of Systems   Constitutional:  Negative for appetite change, chills, fever and unexpected weight change. HENT:  Negative for congestion, rhinorrhea and sore throat. Eyes:  Negative for pain and visual disturbance. Respiratory:  Negative for cough, chest tightness and shortness of breath. Cardiovascular:  Negative for chest pain and palpitations.    Gastrointestinal:  Negative for abdominal

## 2023-12-13 DIAGNOSIS — E78.2 MIXED HYPERLIPIDEMIA: ICD-10-CM

## 2023-12-13 RX ORDER — ROSUVASTATIN CALCIUM 10 MG/1
10 TABLET, COATED ORAL DAILY
Qty: 90 TABLET | Refills: 1 | Status: SHIPPED | OUTPATIENT
Start: 2023-12-13

## 2023-12-14 NOTE — TELEPHONE ENCOUNTER
Patient says he isn't seeing Dr. Phillip Moeller anymore and needs Dr Marjan Roman to refill his meds

## 2023-12-15 RX ORDER — GLIMEPIRIDE 4 MG/1
4 TABLET ORAL DAILY
Qty: 90 TABLET | Refills: 0 | Status: SHIPPED | OUTPATIENT
Start: 2023-12-15

## 2024-01-29 RX ORDER — GEMFIBROZIL 600 MG/1
600 TABLET, FILM COATED ORAL 2 TIMES DAILY
Qty: 180 TABLET | Refills: 0 | Status: SHIPPED | OUTPATIENT
Start: 2024-01-29

## 2024-02-09 LAB
CHOLESTEROL, TOTAL: 107 MG/DL
CHOLESTEROL/HDL RATIO: 2.6
ESTIMATED AVERAGE GLUCOSE: ABNORMAL
HBA1C MFR BLD: 8.6 %
HDLC SERPL-MCNC: 41 MG/DL (ref 35–70)
LDL CHOLESTEROL CALCULATED: 39 MG/DL (ref 0–160)
NONHDLC SERPL-MCNC: 66 MG/DL
TRIGL SERPL-MCNC: 207 MG/DL
VLDLC SERPL CALC-MCNC: ABNORMAL MG/DL

## 2024-02-27 LAB
ALBUMIN SERPL-MCNC: 4.7 G/DL
ALP BLD-CCNC: 51 U/L
ALT SERPL-CCNC: 22 U/L
ANION GAP SERPL CALCULATED.3IONS-SCNC: ABNORMAL MMOL/L
AST SERPL-CCNC: 20 U/L
BASOPHILS ABSOLUTE: 60 /ΜL
BASOPHILS RELATIVE PERCENT: 0.9 %
BILIRUB SERPL-MCNC: 0.9 MG/DL (ref 0.1–1.4)
BUN BLDV-MCNC: 17 MG/DL
CALCIUM SERPL-MCNC: 10.2 MG/DL
CHLORIDE BLD-SCNC: 103 MMOL/L
CHOLESTEROL, TOTAL: 132 MG/DL
CHOLESTEROL/HDL RATIO: 3.1
CO2: 24 MMOL/L
CREAT SERPL-MCNC: 0.93 MG/DL
CREATININE URINE: NORMAL
EGFR: 89
EOSINOPHILS ABSOLUTE: 181 /ΜL
EOSINOPHILS RELATIVE PERCENT: 2.7 %
ESTIMATED AVERAGE GLUCOSE: ABNORMAL
GLUCOSE BLD-MCNC: 206 MG/DL
HBA1C MFR BLD: 8.6 %
HCT VFR BLD CALC: 49.9 % (ref 41–53)
HDLC SERPL-MCNC: 42 MG/DL (ref 35–70)
HEMOGLOBIN: 17.1 G/DL (ref 13.5–17.5)
LDL CHOLESTEROL CALCULATED: 61 MG/DL (ref 0–160)
LYMPHOCYTES ABSOLUTE: 2325 /ΜL
LYMPHOCYTES RELATIVE PERCENT: 34.7 %
MAGNESIUM: 1.9 MG/DL
MCH RBC QN AUTO: 30.5 PG
MCHC RBC AUTO-ENTMCNC: 34.3 G/DL
MCV RBC AUTO: 89.1 FL
MICROALBUMIN/CREAT 24H UR: NORMAL MG/G{CREAT}
MICROALBUMIN/CREAT UR-RTO: 77 MG/G
MONOCYTES ABSOLUTE: 536 /ΜL
MONOCYTES RELATIVE PERCENT: 8 %
NEUTROPHILS ABSOLUTE: 3598 /ΜL
NEUTROPHILS RELATIVE PERCENT: 53.7 %
NONHDLC SERPL-MCNC: ABNORMAL MG/DL
PLATELET # BLD: 252 K/ΜL
PMV BLD AUTO: 11.1 FL
POTASSIUM SERPL-SCNC: 4.5 MMOL/L
RBC # BLD: 5.6 10^6/ΜL
SODIUM BLD-SCNC: 140 MMOL/L
TOTAL PROTEIN: 7.8
TRIGL SERPL-MCNC: 230 MG/DL
TSH SERPL DL<=0.05 MIU/L-ACNC: 2.32 UIU/ML
VITAMIN B-12: 500
VITAMIN D 25-HYDROXY: 34
VITAMIN D2, 25 HYDROXY: NORMAL
VITAMIN D3,25 HYDROXY: NORMAL
VLDLC SERPL CALC-MCNC: ABNORMAL MG/DL
WBC # BLD: 6.7 10^3/ML

## 2024-02-28 DIAGNOSIS — E11.65 TYPE 2 DIABETES MELLITUS WITH HYPERGLYCEMIA, WITHOUT LONG-TERM CURRENT USE OF INSULIN (HCC): ICD-10-CM

## 2024-02-28 LAB
BILIRUBIN, URINE: NEGATIVE
BLOOD, URINE: NEGATIVE
CLARITY: CLEAR
COLOR: YELLOW
CREATININE URINE: 52 MG/DL
GLUCOSE URINE: NORMAL
KETONES, URINE: NEGATIVE
LEUKOCYTE ESTERASE, URINE: NEGATIVE
MICROALBUMIN/CREAT 24H UR: 4 MG/G{CREAT}
NITRITE, URINE: NEGATIVE
PH UA: 5.5 (ref 4.5–8)
PROTEIN UA: NEGATIVE
SPECIFIC GRAVITY, URINE: 1.03
UROBILINOGEN, URINE: NORMAL

## 2024-02-29 ENCOUNTER — TELEPHONE (OUTPATIENT)
Dept: FAMILY MEDICINE CLINIC | Age: 69
End: 2024-02-29

## 2024-03-01 DIAGNOSIS — E78.2 MIXED HYPERLIPIDEMIA: ICD-10-CM

## 2024-03-01 DIAGNOSIS — R53.83 OTHER FATIGUE: ICD-10-CM

## 2024-03-01 DIAGNOSIS — E11.65 TYPE 2 DIABETES MELLITUS WITH HYPERGLYCEMIA, WITHOUT LONG-TERM CURRENT USE OF INSULIN (HCC): ICD-10-CM

## 2024-03-01 DIAGNOSIS — E55.9 VITAMIN D DEFICIENCY: ICD-10-CM

## 2024-03-01 DIAGNOSIS — Z79.899 LONG TERM CURRENT USE OF THERAPEUTIC DRUG: ICD-10-CM

## 2024-03-01 NOTE — TELEPHONE ENCOUNTER
Please let the patient know that blood work results showed    Urine analysis showed sugar this is most likely related to medication such as Jardiance if taking    Otherwise urine analysis was normal with no evidence of microscopic blood or microscopic protein    Those were the only results that we received and there were a set of blood work orders that have not been received please check with the patient to see if I had the blood work done and if he has to see if the lab can send us those results    Thanks

## 2024-03-03 ENCOUNTER — TELEPHONE (OUTPATIENT)
Dept: FAMILY MEDICINE CLINIC | Age: 69
End: 2024-03-03

## 2024-03-03 NOTE — TELEPHONE ENCOUNTER
Please let the patient know that blood work results showed    Sugar was elevated.  Hemoglobin A1c is a measure 3-month sugar control was still uncontrolled at 8.6.  Goal would be 7 or less    Cholesterol levels were fair.  Triglyceride levels however were still elevated above 1    Vitamin D level was normal but on the low end of normal    Vitamin B12 and folic acid levels were normal    Blood counts were normal with borderline increased hemoglobin    Thyroid level was normal    Other electrolytes including magnesium, liver functions, and kidney function values were normal    Thanks

## 2024-03-04 NOTE — TELEPHONE ENCOUNTER
Patient advised, he is asking if he should increase the amount of Vitamin D, currently taking 25 mcg daily.  Please advise.

## 2024-03-13 ENCOUNTER — OFFICE VISIT (OUTPATIENT)
Dept: PRIMARY CARE CLINIC | Age: 69
End: 2024-03-13
Payer: MEDICARE

## 2024-03-13 VITALS
TEMPERATURE: 97.2 F | DIASTOLIC BLOOD PRESSURE: 56 MMHG | OXYGEN SATURATION: 98 % | WEIGHT: 213 LBS | HEART RATE: 85 BPM | SYSTOLIC BLOOD PRESSURE: 112 MMHG | BODY MASS INDEX: 33.43 KG/M2 | HEIGHT: 67 IN

## 2024-03-13 DIAGNOSIS — R33.8 BENIGN PROSTATIC HYPERPLASIA WITH URINARY RETENTION: ICD-10-CM

## 2024-03-13 DIAGNOSIS — R53.83 OTHER FATIGUE: ICD-10-CM

## 2024-03-13 DIAGNOSIS — N40.1 BENIGN PROSTATIC HYPERPLASIA WITH URINARY RETENTION: ICD-10-CM

## 2024-03-13 DIAGNOSIS — E11.65 TYPE 2 DIABETES MELLITUS WITH HYPERGLYCEMIA, WITHOUT LONG-TERM CURRENT USE OF INSULIN (HCC): ICD-10-CM

## 2024-03-13 DIAGNOSIS — E55.9 VITAMIN D DEFICIENCY: ICD-10-CM

## 2024-03-13 DIAGNOSIS — Z12.11 SCREENING FOR MALIGNANT NEOPLASM OF COLON: ICD-10-CM

## 2024-03-13 DIAGNOSIS — L40.9 PSORIASIS: ICD-10-CM

## 2024-03-13 DIAGNOSIS — Z79.899 LONG TERM CURRENT USE OF THERAPEUTIC DRUG: ICD-10-CM

## 2024-03-13 DIAGNOSIS — M79.644 THUMB PAIN, RIGHT: Primary | ICD-10-CM

## 2024-03-13 DIAGNOSIS — K21.9 GASTROESOPHAGEAL REFLUX DISEASE WITHOUT ESOPHAGITIS: ICD-10-CM

## 2024-03-13 DIAGNOSIS — E78.2 MIXED HYPERLIPIDEMIA: ICD-10-CM

## 2024-03-13 PROCEDURE — 1123F ACP DISCUSS/DSCN MKR DOCD: CPT | Performed by: INTERNAL MEDICINE

## 2024-03-13 PROCEDURE — 99213 OFFICE O/P EST LOW 20 MIN: CPT | Performed by: INTERNAL MEDICINE

## 2024-03-13 PROCEDURE — 3052F HG A1C>EQUAL 8.0%<EQUAL 9.0%: CPT | Performed by: INTERNAL MEDICINE

## 2024-03-13 RX ORDER — FOLIC ACID 1 MG/1
1 TABLET ORAL DAILY
COMMUNITY

## 2024-03-13 SDOH — ECONOMIC STABILITY: FOOD INSECURITY: WITHIN THE PAST 12 MONTHS, YOU WORRIED THAT YOUR FOOD WOULD RUN OUT BEFORE YOU GOT MONEY TO BUY MORE.: NEVER TRUE

## 2024-03-13 SDOH — ECONOMIC STABILITY: FOOD INSECURITY: WITHIN THE PAST 12 MONTHS, THE FOOD YOU BOUGHT JUST DIDN'T LAST AND YOU DIDN'T HAVE MONEY TO GET MORE.: NEVER TRUE

## 2024-03-13 SDOH — ECONOMIC STABILITY: INCOME INSECURITY: HOW HARD IS IT FOR YOU TO PAY FOR THE VERY BASICS LIKE FOOD, HOUSING, MEDICAL CARE, AND HEATING?: NOT HARD AT ALL

## 2024-03-13 ASSESSMENT — PATIENT HEALTH QUESTIONNAIRE - PHQ9
SUM OF ALL RESPONSES TO PHQ QUESTIONS 1-9: 1
SUM OF ALL RESPONSES TO PHQ9 QUESTIONS 1 & 2: 1
2. FEELING DOWN, DEPRESSED OR HOPELESS: 1
1. LITTLE INTEREST OR PLEASURE IN DOING THINGS: 0
SUM OF ALL RESPONSES TO PHQ QUESTIONS 1-9: 1

## 2024-03-13 ASSESSMENT — ENCOUNTER SYMPTOMS
COUGH: 0
ABDOMINAL PAIN: 0
NAUSEA: 0
SORE THROAT: 0
RHINORRHEA: 0
EYE PAIN: 0
DIARRHEA: 0
CONSTIPATION: 0
SHORTNESS OF BREATH: 0
BLOOD IN STOOL: 0
VOMITING: 0
CHEST TIGHTNESS: 0

## 2024-03-13 NOTE — PROGRESS NOTES
jardiance every other day increasing to daily if can tolerate     - not on statin   - not on ACE   - on asprin   - follows with optho     Mixed hyperlipidemia  - watch diet   - on gemfibrozil   - added crestor - no reported side effects.   - follow labs     Benign prostatic hyperplasia with urinary retention  - off inasteride   - s/p TURP (2022)   - improved     Gastroesophageal reflux disease without esophagitis  - watch diet   - off PPI     Psoriasis  - following with dermatology   - has had tried many treatments in the past (UV light, tremyfa)   - uncontrolled     Cardiac arrhythmia, unspecified cardiac arrhythmia type  - check EKG (8/2023) -normal sinus rhythm with PAC, nonspecific ST and T wave changes (T wave flattening laterally and inferiorly)     Discussed with patient about having 2 different primary care providers, and reiterated strongly that I was not a specialist and also a primary care provider    Return in about 3 months (around 6/13/2024) for check up and review.    Orders Placed This Encounter   Procedures    Kavin Abbott MD, Orthopaedics (hand and upper extremities), [place] [facility])     Referral Priority:   Routine     Referral Type:   Eval and Treat     Referral Reason:   Specialty Services Required     Referred to Provider:   Kavin Abbott MD     Requested Specialty:   Orthopedic Surgery     Number of Visits Requested:   1    POCT Fecal Immunochemical Test (FIT)     Standing Status:   Future     Standing Expiration Date:   3/13/2025     Requested Prescriptions      No prescriptions requested or ordered in this encounter        Be Brasher MD  3/13/2024  8:46 AM

## 2024-03-18 ENCOUNTER — TELEPHONE (OUTPATIENT)
Dept: FAMILY MEDICINE CLINIC | Age: 69
End: 2024-03-18

## 2024-03-18 DIAGNOSIS — Z12.11 SCREENING FOR MALIGNANT NEOPLASM OF COLON: ICD-10-CM

## 2024-03-18 LAB
CONTROL: NORMAL
FECAL BLOOD IMMUNOCHEMICAL TEST: NEGATIVE

## 2024-03-18 RX ORDER — GLIMEPIRIDE 4 MG/1
4 TABLET ORAL DAILY
Qty: 90 TABLET | Refills: 0 | Status: SHIPPED | OUTPATIENT
Start: 2024-03-18

## 2024-03-18 NOTE — TELEPHONE ENCOUNTER
Please let the patient know that stool sample for colon cancer screening was negative for blood.     Would recommend follow-up testing in 1 year or consideration for colonoscopy or sooner evaluation if any new changes or new concerns.     Thanks

## 2024-03-18 NOTE — TELEPHONE ENCOUNTER
Patient requesting a refill of his    glimepiride (AMARYL) 4 MG tablet please send to   [facility] - [place], [place] - [address] [place] [address] -   Thank you.

## 2024-04-01 ENCOUNTER — TELEPHONE (OUTPATIENT)
Dept: PRIMARY CARE CLINIC | Age: 69
End: 2024-04-01

## 2024-04-01 NOTE — TELEPHONE ENCOUNTER
Patient notified of results. Do you information for the assistance program? I advised patient that you already left for day and I would call back tomorrow.

## 2024-04-01 NOTE — TELEPHONE ENCOUNTER
Please give him the information for patient assistance program through [facility] to see if he would qualify for patient assistance for the medication.  Otherwise I would recommend referral to endocrinology to further assist with his diabetes

## 2024-04-02 NOTE — TELEPHONE ENCOUNTER
LMOM with [facility] #[phone], and if any further questions contact our office

## 2024-04-03 NOTE — ED NOTES
Called unit to give report, nurse unable to take. Asked for me to call back. I stated I would call back in appx 15 minutes and informed her that [facility] was already here to transport the patient to them. The RN acknowledged the information.       Devi Belcher RN  02/05/23 2004 [Well groomed] : well groomed [Cooperative] : cooperative [Depressed] : depressed [Anxious] : anxious [Full] : full [Clear] : clear [Linear/Goal Directed] : linear/goal directed [None] : none [Somatic] : somatic [Average] : average [WNL] : within normal limits [de-identified] : Poor specifically regarding somatic themes. Good insight regarding presence of psychiatric symptoms.

## 2024-05-13 ENCOUNTER — TELEPHONE (OUTPATIENT)
Dept: PRIMARY CARE CLINIC | Age: 69
End: 2024-05-13

## 2024-05-13 NOTE — TELEPHONE ENCOUNTER
Spoke to dr enrique peñaloza, doctor said he will not see the the patient anymore due to him seeing another provider and this is the second time. Dr. Brasher is the patient PCP patient is to follow up with him.       Patient was notified

## 2024-05-13 NOTE — TELEPHONE ENCOUNTER
Pt called in needing refills on his scripts but it looks like patient is no longer a patient of Dr. Jan Peñalzoa  as of 08/02/2023 patient saw Dr. Brasher at [facility]. Patient has had a few visit since then with this doctor.     Patient wants refills but I told him that Dr. Jan Peñaloza probably won't send any scripts in due to him having another PCP. Patient wants a call back to let him know if the dr jan peñaloza is his PCP I did  inform the patient he switched

## 2024-05-14 RX ORDER — GEMFIBROZIL 600 MG/1
600 TABLET, FILM COATED ORAL 2 TIMES DAILY
Qty: 180 TABLET | Refills: 1 | Status: SHIPPED | OUTPATIENT
Start: 2024-05-14

## 2024-05-14 NOTE — TELEPHONE ENCOUNTER
Pt got these medications from his previous doctor     Last Appointment:  3/13/2024    Future appts:  Future Appointments   Date Time Provider Department Center   5/16/2024  9:00 AM Stacey Ramires APRN - NP [facility] ENDO [facility]   6/19/2024  8:30 AM Be Brasher MD [facility]

## 2024-05-16 ENCOUNTER — FOLLOWUP TELEPHONE ENCOUNTER (OUTPATIENT)
Dept: ENDOCRINOLOGY | Age: 69
End: 2024-05-16

## 2024-05-16 ENCOUNTER — OFFICE VISIT (OUTPATIENT)
Dept: ENDOCRINOLOGY | Age: 69
End: 2024-05-16
Payer: MEDICARE

## 2024-05-16 VITALS — WEIGHT: 212 LBS | BODY MASS INDEX: 33.27 KG/M2 | HEIGHT: 67 IN

## 2024-05-16 DIAGNOSIS — E55.9 VITAMIN D DEFICIENCY: ICD-10-CM

## 2024-05-16 DIAGNOSIS — E11.65 TYPE 2 DIABETES MELLITUS WITH HYPERGLYCEMIA, WITHOUT LONG-TERM CURRENT USE OF INSULIN (HCC): Primary | ICD-10-CM

## 2024-05-16 DIAGNOSIS — E78.2 MIXED HYPERLIPIDEMIA: ICD-10-CM

## 2024-05-16 DIAGNOSIS — E66.09 CLASS 1 OBESITY DUE TO EXCESS CALORIES WITH SERIOUS COMORBIDITY AND BODY MASS INDEX (BMI) OF 33.0 TO 33.9 IN ADULT: ICD-10-CM

## 2024-05-16 LAB — HBA1C MFR BLD: 8 %

## 2024-05-16 PROCEDURE — 3052F HG A1C>EQUAL 8.0%<EQUAL 9.0%: CPT | Performed by: NURSE PRACTITIONER

## 2024-05-16 PROCEDURE — 83036 HEMOGLOBIN GLYCOSYLATED A1C: CPT | Performed by: NURSE PRACTITIONER

## 2024-05-16 PROCEDURE — 99204 OFFICE O/P NEW MOD 45 MIN: CPT | Performed by: NURSE PRACTITIONER

## 2024-05-16 PROCEDURE — 1123F ACP DISCUSS/DSCN MKR DOCD: CPT | Performed by: NURSE PRACTITIONER

## 2024-05-16 RX ORDER — BLOOD-GLUCOSE SENSOR
EACH MISCELLANEOUS
Qty: 6 EACH | Refills: 3 | Status: SHIPPED | OUTPATIENT
Start: 2024-05-16

## 2024-05-16 RX ORDER — KETOROLAC TROMETHAMINE 30 MG/ML
INJECTION, SOLUTION INTRAMUSCULAR; INTRAVENOUS
Qty: 1 EACH | Refills: 0 | Status: SHIPPED | OUTPATIENT
Start: 2024-05-16

## 2024-05-16 NOTE — TELEPHONE ENCOUNTER
Met with patient in endo office for diabetes education. He has had diabetes many years and tries to follow healthy diet. Checks labels for sugar content. Eats two meals/day, skipping lunch - snacks on cheese curls and chips instead, occasionally gets soup/salad bar at [employer]. Emphasized importance of carb consistent meals for stable blood sugar. Made suggestions for patient's lunches, including sandwiches or salads with soup, or snack platters. Reviewed label reading with patient, recommended 45-60 grams CHO/meal and 15-30 grams CHO/snack. Patient scheduled for meal planning 5/23/24.     FLAKITO Brito LD

## 2024-05-16 NOTE — PROGRESS NOTES
[facility]  [facility] Department of Endocrinology Diabetes and Metabolism   [address], [place] [address]  Phone: [phone]  Fax: [phone]    Date of Service: 5/16/2024    Primary Care Physician: Be Brasher MD  Referring physician: Be Brasher MD  Provider: LAUREN Hill NP     Reason for the visit:    New pt referral, Type 2 DM     History of Present Illness:  The history is provided by the patient. No  was used. Accuracy of the patient data is excellent.  Ja Rosario is a very pleasant 68 y.o. male seen today for diabetes management     Ja Rosario was diagnosed with diabetes at age 58  and currently on metformin 1000 mg BID with meals, Amaryl 4 mg daily, jardiance 10 mg daily     The patient has been checking blood sugar  -> difficulty with fingersticks due to arthritis issues in his hand      Most recent A1c results summarized below  Lab Results   Component Value Date/Time    LABA1C 8.6 02/26/2024 12:00 AM    LABA1C 8.6 02/08/2024 12:00 AM    LABA1C 9.3 11/27/2023 12:00 AM     The patient has been mindful of what has been eating and following diabetic diet as encouraged  Skips lunch  I reviewed current medications and the patient has no issues with diabetes medications   The patient is due for an eye exam. No h/o diabetic retinopathy  The patient seeing podiatrist regularly  Microvascular complications:  No Retinopathy, Nephropathy, +  Neuropathy feet   Macrovascular complications: no CAD, PVD, or Stroke    No HX of pancreatitis  No Hx of MTC  No HX of gastroparesis   No HX of UTI/Mycotic infection       PAST MEDICAL HISTORY   Past Medical History:   Diagnosis Date    Hyperlipidemia     Obesity (BMI 35.0-39.9 without comorbidity)     T2DM (type 2 diabetes mellitus) (HCC)        PAST SURGICAL HISTORY   Past Surgical History:   Procedure Laterality Date    TURP      WISDOM TOOTH EXTRACTION         SOCIAL HISTORY   Tobacco:    None

## 2024-05-16 NOTE — PATIENT INSTRUCTIONS
Metformin 1000 mg BID with meals  Amaryl 4 mg daily  Increase jardiance 25  mg daily  Porfirio ordered

## 2024-05-17 ENCOUNTER — TELEPHONE (OUTPATIENT)
Dept: PRIMARY CARE CLINIC | Age: 69
End: 2024-05-17

## 2024-05-17 NOTE — TELEPHONE ENCOUNTER
Patient calling, when he went to diabetes education, he was advised to double his Jardiance from 10mg to 20mg.  He wanted to run this by you first before taking the extra dose.  Patient states that his A1c was in the 9 range which is why they wanted him to increase.  Please advise.    Patient also states was taking glimeperide 4mg needs refilled, medication pended.

## 2024-05-20 RX ORDER — GLIMEPIRIDE 4 MG/1
4 TABLET ORAL DAILY
Qty: 90 TABLET | Refills: 1 | Status: SHIPPED | OUTPATIENT
Start: 2024-05-20

## 2024-05-20 NOTE — TELEPHONE ENCOUNTER
I have reviewed endocrinology's note and given that the diabetes is still uncontrolled but improved I would recommend moving forward with the increase in the Jardiance.     However Jardiance is usually increased to 25 mg and would need a new prescription for this.     Please ask if patient did get a new prescription for 25 mg tablets    Requested Prescriptions     Signed Prescriptions Disp Refills    glimepiride (AMARYL) 4 MG tablet 90 tablet 1     Sig: Take 1 tablet by mouth daily     Authorizing Provider: JAMIR GARCIA     Glimepiride was sent to pharmacy    Thanks

## 2024-05-20 NOTE — TELEPHONE ENCOUNTER
I apologize but I am a bit confused.  He is on Jardiance and doing well with that but now he is not able to take Jardiance?  If that is the case we are going to have to have a follow-up appointment with endocrinology to discuss other treatment options as from his last visit they suggested just going up on the Jardiance.    We may need to schedule an appointment to discuss with him other treatment options and I recommend him discussing with endocrinology other treatment options

## 2024-05-20 NOTE — TELEPHONE ENCOUNTER
Pt says he's going to finish up what he has first, doesn't want to throw it away, he does not have the new prescription of jardiance yet too expensive and he can't afford it.

## 2024-05-21 NOTE — TELEPHONE ENCOUNTER
Patient states he has been taking two Jardiance 10 mg tablets daily, he has enough to last 3 more weeks and wants to use this prescription first before filling the new prescription.      He also has a 28 day supply of the Jardiance 25 mg from the diabetes center that he plans to use before filling the new Jardiance prescription.    Please advise.

## 2024-05-23 ENCOUNTER — HOSPITAL ENCOUNTER (OUTPATIENT)
Dept: DIABETES SERVICES | Age: 69
Setting detail: THERAPIES SERIES
Discharge: HOME | End: 2024-05-23
Attending: INTERNAL MEDICINE
Payer: MEDICARE

## 2024-05-23 PROCEDURE — 97802 MEDICAL NUTRITION INDIV IN: CPT

## 2024-05-23 ASSESSMENT — PROBLEM AREAS IN DIABETES QUESTIONNAIRE (PAID)
FEELING DEPRESSED WHEN YOU THINK ABOUT LIVING WITH DIABETES: NOT A PROBLEM
WORRYING ABOUT THE FUTURE AND THE POSSIBILITY OF SERIOUS COMPLICATIONS: MINOR PROBLEM
FEELING THAT DIABETES IS TAKING UP TOO MUCH OF YOUR MENTAL AND PHYSICAL ENERGY EVERY DAY: NOT A PROBLEM
COPING WITH COMPLICATIONS OF DIABETES: NOT A PROBLEM
PAID-5 TOTAL SCORE: 3
FEELING SCARED WHEN YOU THINK ABOUT LIVING WITH DIABETES: MODERATE PROBLEM

## 2024-05-23 NOTE — PROGRESS NOTES
Results   Component Value Date    ALEX 3.1 02/26/2024         What was your last A1C result? 8.0     A1C goal:directed by provider  Other:     Diabetes Understanding self-assessment:        [] Good        [x] Fair        [] Poor    Self-Monitoring Blood Glucose: [] Yes  [x] No  Stated FBG average: n/a  Stated Post meal Blood glucose average: n/a    Motivational Scale:  10     Motivators for lifestyle change:   Health reasons    Barriers:    none    Stress/Environment Issues that may affect PO intake:  []Turns toward food for comfort when stressed/bored anxious  []Loses appetite when stressed/bored anxious  [x]No change   [x]Diabetes Distress Score from PAID-5: 3        Work:  []Full time  []Part time  []In school  [x]Retired  []Unemployed   []Day shift  []Night Shift  []Other:    Financial Concerns:        []Yes - none             [x]No    Do you have a Support person in your life?  []Yes  [x]No     Current Exercise Pattern:   [x]Yes 3 days a week  []No    Physical Exam:  Overall appearance: Overweight  Comment:  Behavioral risks: None identified  Comment:  Muscle Tone appearance: normal  Comment:  Skin appearance: Normal  Comment:  Eyes: normal  Comment:  Kidney:   Comment:  Feet:   Comment:    NUTRITION DIAGNOSIS:  food and nutrition related knowledge deficit RELATED TO previous exposure to incorrect nutrition related information AS EVIDENCED BY discussion with patient and diet recall      INTERVENTIONS AND GOALS:    Caloric Needs: 1900 calories per day    Method of determining estimated energy requirements:  CBW x energy requirements 96 kg x 20 kaley/kg = 1920 kaley/day    Obese/inactive 20 kcals/kg  >55, active women, sedentary men 25 kcals/kg  Active men, very active women   30 kcals/kg  Thin, very active men  40 kcal/kg    Planning: I will eat three meals a day with 60 grams of carbs at each meal and have 15-30 grams carbs as hs snack.    Implementation/Follow-up plan: Patient will call with any questions.

## 2024-06-19 ENCOUNTER — OFFICE VISIT (OUTPATIENT)
Dept: PRIMARY CARE CLINIC | Age: 69
End: 2024-06-19
Payer: MEDICARE

## 2024-06-19 ENCOUNTER — OFFICE VISIT (OUTPATIENT)
Dept: PRIMARY CARE CLINIC | Age: 69
End: 2024-06-19

## 2024-06-19 VITALS
HEART RATE: 77 BPM | HEIGHT: 67 IN | WEIGHT: 210 LBS | SYSTOLIC BLOOD PRESSURE: 118 MMHG | OXYGEN SATURATION: 97 % | DIASTOLIC BLOOD PRESSURE: 58 MMHG | TEMPERATURE: 97.4 F | BODY MASS INDEX: 32.96 KG/M2

## 2024-06-19 VITALS — WEIGHT: 210 LBS | BODY MASS INDEX: 32.96 KG/M2 | HEIGHT: 67 IN

## 2024-06-19 DIAGNOSIS — Z79.899 LONG TERM CURRENT USE OF THERAPEUTIC DRUG: ICD-10-CM

## 2024-06-19 DIAGNOSIS — Z00.00 MEDICARE ANNUAL WELLNESS VISIT, SUBSEQUENT: ICD-10-CM

## 2024-06-19 DIAGNOSIS — M79.644 THUMB PAIN, RIGHT: ICD-10-CM

## 2024-06-19 DIAGNOSIS — E55.9 VITAMIN D DEFICIENCY: ICD-10-CM

## 2024-06-19 DIAGNOSIS — R33.8 BENIGN PROSTATIC HYPERPLASIA WITH URINARY RETENTION: ICD-10-CM

## 2024-06-19 DIAGNOSIS — E11.65 TYPE 2 DIABETES MELLITUS WITH HYPERGLYCEMIA, WITHOUT LONG-TERM CURRENT USE OF INSULIN (HCC): Primary | ICD-10-CM

## 2024-06-19 DIAGNOSIS — N40.1 BENIGN PROSTATIC HYPERPLASIA WITH URINARY RETENTION: ICD-10-CM

## 2024-06-19 DIAGNOSIS — K21.9 GASTROESOPHAGEAL REFLUX DISEASE WITHOUT ESOPHAGITIS: ICD-10-CM

## 2024-06-19 DIAGNOSIS — R53.83 OTHER FATIGUE: ICD-10-CM

## 2024-06-19 DIAGNOSIS — E78.2 MIXED HYPERLIPIDEMIA: ICD-10-CM

## 2024-06-19 DIAGNOSIS — L40.9 PSORIASIS: ICD-10-CM

## 2024-06-19 LAB — HBA1C MFR BLD: 8 %

## 2024-06-19 PROCEDURE — G0439 PPPS, SUBSEQ VISIT: HCPCS | Performed by: INTERNAL MEDICINE

## 2024-06-19 PROCEDURE — 3052F HG A1C>EQUAL 8.0%<EQUAL 9.0%: CPT | Performed by: INTERNAL MEDICINE

## 2024-06-19 PROCEDURE — 1123F ACP DISCUSS/DSCN MKR DOCD: CPT | Performed by: INTERNAL MEDICINE

## 2024-06-19 RX ORDER — GLIMEPIRIDE 4 MG/1
4 TABLET ORAL DAILY
Qty: 90 TABLET | Refills: 1 | Status: SHIPPED | OUTPATIENT
Start: 2024-06-19

## 2024-06-19 RX ORDER — ROSUVASTATIN CALCIUM 10 MG/1
10 TABLET, COATED ORAL DAILY
Qty: 90 TABLET | Refills: 1 | Status: SHIPPED | OUTPATIENT
Start: 2024-06-19

## 2024-06-19 ASSESSMENT — ENCOUNTER SYMPTOMS
COUGH: 0
SHORTNESS OF BREATH: 0
CHEST TIGHTNESS: 0
ABDOMINAL PAIN: 0
SORE THROAT: 0
RHINORRHEA: 0
EYE PAIN: 0
CONSTIPATION: 0
VOMITING: 0
NAUSEA: 0
DIARRHEA: 0
BLOOD IN STOOL: 0

## 2024-06-19 ASSESSMENT — PATIENT HEALTH QUESTIONNAIRE - PHQ9
SUM OF ALL RESPONSES TO PHQ QUESTIONS 1-9: 0
SUM OF ALL RESPONSES TO PHQ QUESTIONS 1-9: 0
SUM OF ALL RESPONSES TO PHQ9 QUESTIONS 1 & 2: 0
SUM OF ALL RESPONSES TO PHQ QUESTIONS 1-9: 0
SUM OF ALL RESPONSES TO PHQ QUESTIONS 1-9: 0
1. LITTLE INTEREST OR PLEASURE IN DOING THINGS: NOT AT ALL
2. FEELING DOWN, DEPRESSED OR HOPELESS: NOT AT ALL

## 2024-06-19 NOTE — PROGRESS NOTES
Medicare Annual Wellness Visit    Ja Rosario is here for Medicare AWV    Assessment & Plan   Type 2 diabetes mellitus with hyperglycemia, without long-term current use of insulin (HCC)  Medicare annual wellness visit, subsequent    Health Maintenance   - immunizations:   Influenza Vaccination (2022), (2023)   Pneumonia Vaccination (2021) - prevnar 13, (2021) - pneumococcal 23   Zoster/Shingles Vaccine - (10/2021) #1, (4/2022) #2 - shingrix   Tetanus Vaccination (2022) - tdap   Covid (2/26/2021) #1, (3/2021) #2, (1/3/2022) #3, (7/2022) #4    - Screenings:   Colonoscopy   FIT (12/2021) - neg, FIT (3/24) -negative  Prostate     Recommendations for Preventive Services Due: see orders and patient instructions/AVS.  Recommended screening schedule for the next 5-10 years is provided to the patient in written form: see Patient Instructions/AVS.     Return for Medicare Annual Wellness Visit in 1 year.       Subjective       Patient's complete Health Risk Assessment and screening values have been reviewed and are found in Flowsheets. The following problems were reviewed today and where indicated follow up appointments were made and/or referrals ordered.    Positive Risk Factor Screenings with Interventions:       Cognitive:   Clock Drawing Test (CDT): Normal  Words recalled: 0 Words Recalled  Total Score: (!) 2  Total Score Interpretation: Abnormal Mini-Cog  Interventions:  See AVS for additional education material           General HRA Questions:  Select all that apply: (!) New or Increased Pain    Pain Interventions:  See AVS for additional education material      Activity, Diet, and Weight:  On average, how many days per week do you engage in moderate to strenuous exercise (like a brisk walk)?: 4 days  On average, how many minutes do you engage in exercise at this level?: 20 min    Do you eat balanced/healthy meals regularly?: Yes    Body mass index is 32.88 kg/m². (!) Abnormal    Obesity Interventions:  See AVS for  Patient has already received information on advance care directives and request has been made to bring us a copy

## 2024-06-19 NOTE — PROGRESS NOTES
[facility]   Internal Medicine     2024  Ja Rosario : 1955 Sex: male  Age:68 y.o.    Chief Complaint   Patient presents with    Diabetes    Discuss Medications     Vitamin C and dosage of Jardiance        HPI:   Patient presents to office for evaluation of the following medical concerns.    -  has thumb pain. Right sided. [facility] has seen ortho hand. States had injection.  was given braces but did not. Has not had follow up.    -  has diabetes. On metformin, On glimepiride, On jardiance states due to stomach aches and cramps, improved. Did not schedule with diabetes education. Has seen endocrinology last received note  () -hemoglobin A1c 8.0, uncontrolled diabetes change metformin to 1000 mg twice a day Amaryl 4 mg once a day, increase Jardiance to 25 mg daily follow-up 3 months. Last A1c was 8.0 (2024).    -  has high cholesterol.  has high triglycerides. On gemfibrozil. On crestor. Last lab (2024) - elevated triglycerides     -  has psoriasis. [facility] follows with dermatology (Dr. Sow). Was placed on methotrexate and folic acid, no longer taking.  was started on sotyku (deucravacitinib). No reported side effects.  May be helping.     -  has acid reflux. No longer on medications. Previously was on pantoprazole.     -  has had BPH and urine retention. [facility] has seen urology. [facility] had a procedure TURP (2022). PSA () - 1.36     Health Maintenance   - immunizations:   Influenza Vaccination (), ()   Pneumonia Vaccination () - prevnar 13, () - pneumococcal 23   Zoster/Shingles Vaccine - (10/2021) #1, (2022) #2 - shingrix   Tetanus Vaccination () - tdap   Covid (2021) #1, (3/2021) #2, (1/3/2022) #3, (2022) #4    - Screenings:   Colonoscopy   FIT (2021) - neg, FIT (3/24) -negative  Prostate     ROS:  Review of Systems   Constitutional:  Negative for appetite change, chills, fever and

## 2024-06-19 NOTE — PATIENT INSTRUCTIONS
get 7 to 9 hours of sleep each night.     Limit alcohol to 2 drinks a day for men and 1 drink a day for women. Too much alcohol can cause health problems.     Manage other health problems such as diabetes, high blood pressure, and high cholesterol. If you think you may have a problem with alcohol or drug use, talk to your doctor.   Medicines    Take your medicines exactly as prescribed. Call your doctor if you think you are having a problem with your medicine.     If your doctor recommends aspirin, take the amount directed each day. Make sure you take aspirin and not another kind of pain reliever, such as acetaminophen (Tylenol).   When should you call for help?   Call 911 if you have symptoms of a heart attack. These may include:    Chest pain or pressure, or a strange feeling in the chest.     Sweating.     Shortness of breath.     Pain, pressure, or a strange feeling in the back, neck, jaw, or upper belly or in one or both shoulders or arms.     Lightheadedness or sudden weakness.     A fast or irregular heartbeat.   After you call 911, the  may tell you to chew 1 adult-strength or 2 to 4 low-dose aspirin. Wait for an ambulance. Do not try to drive yourself.  Watch closely for changes in your health, and be sure to contact your doctor if you have any problems.  Where can you learn more?  Go to https://www.[employer].net/patientEd and enter F075 to learn more about \"A Healthy Heart: Care Instructions.\"  Current as of: June 24, 2023  Content Version: 14.1  © 2006-2024 [employer].   Care instructions adapted under license by [employer]. If you have questions about a medical condition or this instruction, always ask your healthcare professional. [employer] disclaims any warranty or liability for your use of this information.      Personalized Preventive Plan for Ja Rosario - 6/19/2024  Medicare offers a range of preventive health benefits. Some of the tests and screenings are

## 2024-07-02 RX ORDER — ACYCLOVIR 400 MG/1
TABLET ORAL
Qty: 9 EACH | Refills: 3 | Status: SHIPPED | OUTPATIENT
Start: 2024-07-02

## 2024-07-02 RX ORDER — ACYCLOVIR 400 MG/1
TABLET ORAL
Qty: 1 EACH | Refills: 3 | Status: SHIPPED | OUTPATIENT
Start: 2024-07-02

## 2024-07-04 ENCOUNTER — HOSPITAL ENCOUNTER (EMERGENCY)
Age: 69
Discharge: HOME | End: 2024-07-04
Attending: STUDENT IN AN ORGANIZED HEALTH CARE EDUCATION/TRAINING PROGRAM
Payer: MEDICARE

## 2024-07-04 VITALS
RESPIRATION RATE: 16 BRPM | HEART RATE: 77 BPM | DIASTOLIC BLOOD PRESSURE: 80 MMHG | OXYGEN SATURATION: 95 % | WEIGHT: 210 LBS | SYSTOLIC BLOOD PRESSURE: 137 MMHG | BODY MASS INDEX: 32.88 KG/M2 | TEMPERATURE: 97.6 F

## 2024-07-04 DIAGNOSIS — L03.213 PERIORBITAL CELLULITIS OF RIGHT EYE: Primary | ICD-10-CM

## 2024-07-04 LAB
ANION GAP SERPL CALCULATED.3IONS-SCNC: 15 MMOL/L (ref 7–16)
ANION GAP SERPL CALCULATED.3IONS-SCNC: 17 MMOL/L (ref 7–16)
BASOPHILS # BLD: 0.07 K/UL (ref 0–0.2)
BASOPHILS NFR BLD: 1 % (ref 0–2)
BUN SERPL-MCNC: 19 MG/DL (ref 6–23)
BUN SERPL-MCNC: 20 MG/DL (ref 6–23)
CALCIUM SERPL-MCNC: 9.4 MG/DL (ref 8.6–10.2)
CALCIUM SERPL-MCNC: 9.8 MG/DL (ref 8.6–10.2)
CHLORIDE SERPL-SCNC: 100 MMOL/L (ref 98–107)
CHLORIDE SERPL-SCNC: 103 MMOL/L (ref 98–107)
CO2 SERPL-SCNC: 20 MMOL/L (ref 22–29)
CO2 SERPL-SCNC: 21 MMOL/L (ref 22–29)
CREAT SERPL-MCNC: 0.8 MG/DL (ref 0.7–1.2)
CREAT SERPL-MCNC: 0.8 MG/DL (ref 0.7–1.2)
CRP SERPL HS-MCNC: <3 MG/L (ref 0–5)
EOSINOPHIL # BLD: 0.21 K/UL (ref 0.05–0.5)
EOSINOPHILS RELATIVE PERCENT: 3 % (ref 0–6)
ERYTHROCYTE [DISTWIDTH] IN BLOOD BY AUTOMATED COUNT: 13.1 % (ref 11.5–15)
ERYTHROCYTE [SEDIMENTATION RATE] IN BLOOD BY WESTERGREN METHOD: 5 MM/HR (ref 0–15)
GFR, ESTIMATED: >90 ML/MIN/1.73M2
GFR, ESTIMATED: >90 ML/MIN/1.73M2
GLUCOSE SERPL-MCNC: 155 MG/DL (ref 74–99)
GLUCOSE SERPL-MCNC: 190 MG/DL (ref 74–99)
HCT VFR BLD AUTO: 47.8 % (ref 37–54)
HGB BLD-MCNC: 16.5 G/DL (ref 12.5–16.5)
IMM GRANULOCYTES # BLD AUTO: 0.04 K/UL (ref 0–0.58)
IMM GRANULOCYTES NFR BLD: 1 % (ref 0–5)
LACTATE BLDV-SCNC: 2.1 MMOL/L (ref 0.5–2.2)
LYMPHOCYTES NFR BLD: 1.92 K/UL (ref 1.5–4)
LYMPHOCYTES RELATIVE PERCENT: 28 % (ref 20–42)
MCH RBC QN AUTO: 29.8 PG (ref 26–35)
MCHC RBC AUTO-ENTMCNC: 34.5 G/DL (ref 32–34.5)
MCV RBC AUTO: 86.4 FL (ref 80–99.9)
MONOCYTES NFR BLD: 0.62 K/UL (ref 0.1–0.95)
MONOCYTES NFR BLD: 9 % (ref 2–12)
NEUTROPHILS NFR BLD: 58 % (ref 43–80)
NEUTS SEG NFR BLD: 4.02 K/UL (ref 1.8–7.3)
PLATELET # BLD AUTO: 218 K/UL (ref 130–450)
PMV BLD AUTO: 10.4 FL (ref 7–12)
POTASSIUM SERPL-SCNC: 4.5 MMOL/L (ref 3.5–5)
POTASSIUM SERPL-SCNC: 4.5 MMOL/L (ref 3.5–5)
RBC # BLD AUTO: 5.53 M/UL (ref 3.8–5.8)
SODIUM SERPL-SCNC: 137 MMOL/L (ref 132–146)
SODIUM SERPL-SCNC: 139 MMOL/L (ref 132–146)
WBC OTHER # BLD: 6.9 K/UL (ref 4.5–11.5)

## 2024-07-04 PROCEDURE — 2500000003 HC RX 250 WO HCPCS: Performed by: STUDENT IN AN ORGANIZED HEALTH CARE EDUCATION/TRAINING PROGRAM

## 2024-07-04 PROCEDURE — 99284 EMERGENCY DEPT VISIT MOD MDM: CPT

## 2024-07-04 PROCEDURE — 86140 C-REACTIVE PROTEIN: CPT

## 2024-07-04 PROCEDURE — 6360000002 HC RX W HCPCS: Performed by: STUDENT IN AN ORGANIZED HEALTH CARE EDUCATION/TRAINING PROGRAM

## 2024-07-04 PROCEDURE — 85652 RBC SED RATE AUTOMATED: CPT

## 2024-07-04 PROCEDURE — 6370000000 HC RX 637 (ALT 250 FOR IP): Performed by: EMERGENCY MEDICINE

## 2024-07-04 PROCEDURE — 96374 THER/PROPH/DIAG INJ IV PUSH: CPT

## 2024-07-04 PROCEDURE — 96375 TX/PRO/DX INJ NEW DRUG ADDON: CPT

## 2024-07-04 PROCEDURE — 80048 BASIC METABOLIC PNL TOTAL CA: CPT

## 2024-07-04 PROCEDURE — 85025 COMPLETE CBC W/AUTO DIFF WBC: CPT

## 2024-07-04 PROCEDURE — 83605 ASSAY OF LACTIC ACID: CPT

## 2024-07-04 PROCEDURE — 2580000003 HC RX 258: Performed by: STUDENT IN AN ORGANIZED HEALTH CARE EDUCATION/TRAINING PROGRAM

## 2024-07-04 PROCEDURE — 6370000000 HC RX 637 (ALT 250 FOR IP): Performed by: STUDENT IN AN ORGANIZED HEALTH CARE EDUCATION/TRAINING PROGRAM

## 2024-07-04 RX ORDER — ERYTHROMYCIN 5 MG/G
1 OINTMENT OPHTHALMIC EVERY 6 HOURS
Qty: 3.5 G | Refills: 0 | Status: SHIPPED | OUTPATIENT
Start: 2024-07-04 | End: 2024-07-14

## 2024-07-04 RX ORDER — ERYTHROMYCIN 5 MG/G
OINTMENT OPHTHALMIC EVERY 6 HOURS SCHEDULED
Status: DISCONTINUED | OUTPATIENT
Start: 2024-07-04 | End: 2024-07-04

## 2024-07-04 RX ORDER — AMOXICILLIN AND CLAVULANATE POTASSIUM 875; 125 MG/1; MG/1
1 TABLET, FILM COATED ORAL ONCE
Status: COMPLETED | OUTPATIENT
Start: 2024-07-04 | End: 2024-07-04

## 2024-07-04 RX ORDER — TETRACAINE HYDROCHLORIDE 5 MG/ML
2 SOLUTION OPHTHALMIC ONCE
Status: COMPLETED | OUTPATIENT
Start: 2024-07-04 | End: 2024-07-04

## 2024-07-04 RX ORDER — ERYTHROMYCIN 5 MG/G
OINTMENT OPHTHALMIC ONCE
Status: COMPLETED | OUTPATIENT
Start: 2024-07-04 | End: 2024-07-04

## 2024-07-04 RX ORDER — AMOXICILLIN AND CLAVULANATE POTASSIUM 875; 125 MG/1; MG/1
1 TABLET, FILM COATED ORAL 2 TIMES DAILY
Qty: 20 TABLET | Refills: 0 | Status: SHIPPED | OUTPATIENT
Start: 2024-07-04 | End: 2024-07-14

## 2024-07-04 RX ORDER — DIPHENHYDRAMINE HYDROCHLORIDE 50 MG/ML
25 INJECTION INTRAMUSCULAR; INTRAVENOUS ONCE
Status: COMPLETED | OUTPATIENT
Start: 2024-07-04 | End: 2024-07-04

## 2024-07-04 RX ORDER — 0.9 % SODIUM CHLORIDE 0.9 %
1000 INTRAVENOUS SOLUTION INTRAVENOUS ONCE
Status: COMPLETED | OUTPATIENT
Start: 2024-07-04 | End: 2024-07-04

## 2024-07-04 RX ADMIN — SODIUM CHLORIDE 1000 ML: 9 INJECTION, SOLUTION INTRAVENOUS at 12:46

## 2024-07-04 RX ADMIN — DIPHENHYDRAMINE HYDROCHLORIDE 25 MG: 50 INJECTION INTRAMUSCULAR; INTRAVENOUS at 12:26

## 2024-07-04 RX ADMIN — TETRACAINE HYDROCHLORIDE 2 DROP: 5 SOLUTION OPHTHALMIC at 12:26

## 2024-07-04 RX ADMIN — FAMOTIDINE 20 MG: 10 INJECTION, SOLUTION INTRAVENOUS at 12:26

## 2024-07-04 RX ADMIN — ERYTHROMYCIN: 5 OINTMENT OPHTHALMIC at 15:40

## 2024-07-04 RX ADMIN — AMOXICILLIN AND CLAVULANATE POTASSIUM 1 TABLET: 875; 125 TABLET, FILM COATED ORAL at 15:39

## 2024-07-04 RX ADMIN — FLUORESCEIN SODIUM 1 MG: 1 STRIP OPHTHALMIC at 12:26

## 2024-07-04 ASSESSMENT — ENCOUNTER SYMPTOMS
SINUS PRESSURE: 0
FACIAL SWELLING: 1
SHORTNESS OF BREATH: 0
EYE PAIN: 0
WHEEZING: 0
VOMITING: 0
BACK PAIN: 0
NAUSEA: 0
SORE THROAT: 0
DIARRHEA: 0
EYE REDNESS: 0
EYE DISCHARGE: 0
COUGH: 0
ABDOMINAL PAIN: 0

## 2024-07-04 ASSESSMENT — VISUAL ACUITY
OU: 20/20
OD: 20/25
OS: 20/20

## 2024-07-04 ASSESSMENT — PAIN - FUNCTIONAL ASSESSMENT: PAIN_FUNCTIONAL_ASSESSMENT: NONE - DENIES PAIN

## 2024-07-04 NOTE — ED PROVIDER NOTES
Department of Emergency Medicine   ED  Provider Note  Admit Date/RoomTime: 7/4/2024  9:56 AM  ED Room: 05/05              \[facility] [place] [facility]\""     Ja Rosario is a 68 y.o. male with a PMHx significant for  DM, HLD, GERD  who presents for evaluation of swelling on right, beginning prior to arrival.  The complaint has been persistent, moderate in severity, and worsened by nothing.   The patient states that if she is working outside, did apply aloe vera to his face.  Is upon awakening this morning he was having swelling to his right eye.  Denies any drainage, discharge, pain.  States he did try opening up but denies any discomfort.  Denies any fevers, chills, chest pain, shortness of breath, nausea, vomiting, diarrhea..     Review of Systems   Constitutional:  Negative for chills and fever.   HENT:  Positive for facial swelling. Negative for ear pain, sinus pressure and sore throat.    Eyes:  Negative for pain, discharge and redness.   Respiratory:  Negative for cough, shortness of breath and wheezing.    Cardiovascular:  Negative for chest pain.   Gastrointestinal:  Negative for abdominal pain, diarrhea, nausea and vomiting.   Genitourinary:  Negative for dysuria and frequency.   Musculoskeletal:  Negative for arthralgias and back pain.   Skin:  Negative for rash and wound.   Neurological:  Negative for weakness and headaches.   Hematological:  Negative for adenopathy.   All other systems reviewed and are negative.       Physical Exam  Vitals and nursing note reviewed.   Constitutional:       General: He is not in acute distress.     Appearance: Normal appearance. He is well-developed. He is not ill-appearing.   HENT:      Head: Normocephalic and atraumatic.      Right Ear: External ear normal.      Left Ear: External ear normal.   Eyes:      General:         Right eye: No discharge.         Left eye: No discharge.      Extraocular Movements: Extraocular movements intact.      Conjunctiva/sclera: Conjunctivae normal.      Pupils:

## 2024-07-05 ENCOUNTER — HOSPITAL ENCOUNTER (EMERGENCY)
Age: 69
Discharge: HOME | End: 2024-07-05
Attending: EMERGENCY MEDICINE
Payer: MEDICARE

## 2024-07-05 VITALS
OXYGEN SATURATION: 97 % | RESPIRATION RATE: 16 BRPM | DIASTOLIC BLOOD PRESSURE: 77 MMHG | SYSTOLIC BLOOD PRESSURE: 151 MMHG | HEART RATE: 84 BPM | BODY MASS INDEX: 32.89 KG/M2 | TEMPERATURE: 98 F | HEIGHT: 67 IN

## 2024-07-05 DIAGNOSIS — L03.211 FACIAL CELLULITIS: Primary | ICD-10-CM

## 2024-07-05 PROCEDURE — 99282 EMERGENCY DEPT VISIT SF MDM: CPT

## 2024-07-05 ASSESSMENT — PAIN - FUNCTIONAL ASSESSMENT: PAIN_FUNCTIONAL_ASSESSMENT: NONE - DENIES PAIN

## 2024-07-05 NOTE — ED PROVIDER NOTES
yesterday, this morning when he woke up he was unable to open his right eye.  States since arrival to the emergency department his eye is now able to open, feels like the swelling is about where it was yesterday.  States he was unable to get the ointment in his eye because his eyelashes felt stuck shut.  Denies fevers chills nausea vomiting.    Nursing Notes were all reviewed and agreed with or any disagreements were addressed in the HPI.      REVIEW OF EXTERNAL NOTE :      ED note lab work from yesterday, 7/4/2024, was treated for periorbital cellulitis.  Had no leukocytosis no left shift, blood sugar is 190 at that time.    Internal medicine office note from 6/19/2024, was noted to have history of diabetes and psoriasis    Discussed with patient using warm washcloth for De Oliveira in the morning to help with eyelid matting.  He has not yet called his ophthalmologist states the office opens at 8 states he will call at 8 to schedule follow-up appointment denies any visual changes.  Consideration for additional lab work or CT scans however he states once that lit his open he could not put the ointment in his eye.  Discussed the importance of close follow-up with ophthalmology.  Consideration for initiating steroid therapy however given diabetes we will defer this at this time.  I was cleansed and ointment that he has with him was applied.  Consideration for giving his morning dose of oral antibiotics every states he needs to eat on it and plans to stop and get breakfast before taking his oral antibiotic    Amount and/or Complexity of Data Reviewed  External Data Reviewed: labs and notes.              CONSULTS: (Who and What was discussed)  None         I am the Primary Clinician of Record.    FINAL IMPRESSION      1. Facial cellulitis          DISPOSITION/PLAN     DISPOSITION Decision To Discharge 07/05/2024 07:51:33 AM      PATIENT REFERRED TO:  Be Brasher MD  [address] [facility] [place]  [address]  [phone]    Call       Pita Robertson MD  [address] [address]  [phone]    Call         DISCHARGE MEDICATIONS:  New Prescriptions    No medications on file       DISCONTINUED MEDICATIONS:  Discontinued Medications    No medications on file              (Please note that portions of this note were completed with a voice recognition program.  Efforts were made to edit the dictations but occasionally words are mis-transcribed.)    Kwasi Duncan DO (electronically signed)           Kwasi Duncan,   07/05/24 0759

## 2024-07-31 LAB — PROSTATE SPECIFIC ANTIGEN: 1.86 NG/ML

## 2024-09-12 ENCOUNTER — OFFICE VISIT (OUTPATIENT)
Dept: ENDOCRINOLOGY | Age: 69
End: 2024-09-12

## 2024-09-12 ENCOUNTER — FOLLOWUP TELEPHONE ENCOUNTER (OUTPATIENT)
Dept: ENDOCRINOLOGY | Age: 69
End: 2024-09-12

## 2024-09-12 VITALS
BODY MASS INDEX: 32.49 KG/M2 | SYSTOLIC BLOOD PRESSURE: 122 MMHG | DIASTOLIC BLOOD PRESSURE: 80 MMHG | HEIGHT: 67 IN | WEIGHT: 207 LBS

## 2024-09-12 DIAGNOSIS — E55.9 VITAMIN D DEFICIENCY: ICD-10-CM

## 2024-09-12 DIAGNOSIS — E78.2 MIXED HYPERLIPIDEMIA: ICD-10-CM

## 2024-09-12 DIAGNOSIS — E11.65 TYPE 2 DIABETES MELLITUS WITH HYPERGLYCEMIA, WITHOUT LONG-TERM CURRENT USE OF INSULIN (HCC): Primary | ICD-10-CM

## 2024-09-12 DIAGNOSIS — E66.09 CLASS 1 OBESITY DUE TO EXCESS CALORIES WITH SERIOUS COMORBIDITY AND BODY MASS INDEX (BMI) OF 33.0 TO 33.9 IN ADULT: ICD-10-CM

## 2024-09-12 LAB — HBA1C MFR BLD: 9 %

## 2024-09-13 LAB
ALBUMIN: 4.6 G/DL
ALP BLD-CCNC: 62 U/L
ALT SERPL-CCNC: 34 U/L
ANION GAP SERPL CALCULATED.3IONS-SCNC: ABNORMAL MMOL/L
AST SERPL-CCNC: 30 U/L
BASOPHILS ABSOLUTE: 60 /ΜL
BASOPHILS RELATIVE PERCENT: 0.8 %
BILIRUB SERPL-MCNC: 1.2 MG/DL (ref 0.1–1.4)
BUN BLDV-MCNC: 21 MG/DL
CALCIUM SERPL-MCNC: 9.7 MG/DL
CHLORIDE BLD-SCNC: 102 MMOL/L
CHOLESTEROL, FASTING: 130
CO2: 23 MMOL/L
CREAT SERPL-MCNC: 0.94 MG/DL
EOSINOPHILS ABSOLUTE: 143 /ΜL
EOSINOPHILS RELATIVE PERCENT: 1.9 %
FOLATE: >24
GFR, ESTIMATED: 88
GLUCOSE BLD-MCNC: 228 MG/DL
HCT VFR BLD CALC: 48.5 % (ref 41–53)
HDLC SERPL-MCNC: 45 MG/DL (ref 35–70)
HEMOGLOBIN: 16.2 G/DL (ref 13.5–17.5)
LDL CHOLESTEROL: 61
LYMPHOCYTES ABSOLUTE: 1860 /ΜL
LYMPHOCYTES RELATIVE PERCENT: 24.8 %
MAGNESIUM: 2 MG/DL
MCH RBC QN AUTO: 29.9 PG
MCHC RBC AUTO-ENTMCNC: 33.4 G/DL
MCV RBC AUTO: 89.6 FL
MONOCYTES ABSOLUTE: 608 /ΜL
MONOCYTES RELATIVE PERCENT: 8.1 %
NEUTROPHILS ABSOLUTE: 4830 /ΜL
NEUTROPHILS RELATIVE PERCENT: 64.4 %
PDW BLD-RTO: 13 %
PLATELET # BLD: 235 K/ΜL
PMV BLD AUTO: 10.9 FL
POTASSIUM SERPL-SCNC: 4.4 MMOL/L
RBC # BLD: 5.41 10^6/ΜL
SODIUM BLD-SCNC: 137 MMOL/L
TOTAL PROTEIN: 7.3 G/DL (ref 6.4–8.2)
TRIGLYCERIDE, FASTING: 161
TSH SERPL DL<=0.05 MIU/L-ACNC: 1.65 UIU/ML
VITAMIN B-12: 666
VITAMIN D 25-HYDROXY: 46
VITAMIN D2, 25 HYDROXY: NORMAL
VITAMIN D3,25 HYDROXY: NORMAL
WBC # BLD: 7.5 10^3/ML

## 2024-09-18 ENCOUNTER — OFFICE VISIT (OUTPATIENT)
Dept: PRIMARY CARE CLINIC | Age: 69
End: 2024-09-18
Payer: MEDICARE

## 2024-09-18 VITALS
DIASTOLIC BLOOD PRESSURE: 60 MMHG | HEART RATE: 75 BPM | SYSTOLIC BLOOD PRESSURE: 120 MMHG | HEIGHT: 67 IN | BODY MASS INDEX: 32.49 KG/M2 | TEMPERATURE: 97.1 F | WEIGHT: 207 LBS | OXYGEN SATURATION: 98 %

## 2024-09-18 DIAGNOSIS — N40.1 BENIGN PROSTATIC HYPERPLASIA WITH URINARY RETENTION: ICD-10-CM

## 2024-09-18 DIAGNOSIS — Z79.899 LONG TERM CURRENT USE OF THERAPEUTIC DRUG: ICD-10-CM

## 2024-09-18 DIAGNOSIS — R53.83 OTHER FATIGUE: ICD-10-CM

## 2024-09-18 DIAGNOSIS — R33.8 BENIGN PROSTATIC HYPERPLASIA WITH URINARY RETENTION: ICD-10-CM

## 2024-09-18 DIAGNOSIS — K21.9 GASTROESOPHAGEAL REFLUX DISEASE WITHOUT ESOPHAGITIS: ICD-10-CM

## 2024-09-18 DIAGNOSIS — L40.9 PSORIASIS: ICD-10-CM

## 2024-09-18 DIAGNOSIS — E55.9 VITAMIN D DEFICIENCY: ICD-10-CM

## 2024-09-18 DIAGNOSIS — E78.2 MIXED HYPERLIPIDEMIA: ICD-10-CM

## 2024-09-18 DIAGNOSIS — E11.65 TYPE 2 DIABETES MELLITUS WITH HYPERGLYCEMIA, WITHOUT LONG-TERM CURRENT USE OF INSULIN (HCC): Primary | ICD-10-CM

## 2024-09-18 PROCEDURE — 99214 OFFICE O/P EST MOD 30 MIN: CPT | Performed by: INTERNAL MEDICINE

## 2024-09-18 PROCEDURE — 3052F HG A1C>EQUAL 8.0%<EQUAL 9.0%: CPT | Performed by: INTERNAL MEDICINE

## 2024-09-18 PROCEDURE — 1123F ACP DISCUSS/DSCN MKR DOCD: CPT | Performed by: INTERNAL MEDICINE

## 2024-09-18 RX ORDER — ROSUVASTATIN CALCIUM 10 MG/1
10 TABLET, COATED ORAL DAILY
Qty: 90 TABLET | Refills: 1 | Status: SHIPPED | OUTPATIENT
Start: 2024-09-18

## 2024-09-18 ASSESSMENT — ENCOUNTER SYMPTOMS
CONSTIPATION: 0
BLOOD IN STOOL: 0
COUGH: 0
SHORTNESS OF BREATH: 0
CHEST TIGHTNESS: 0
ABDOMINAL PAIN: 0
RHINORRHEA: 0
DIARRHEA: 0
NAUSEA: 0
VOMITING: 0
EYE PAIN: 0

## 2024-12-02 ENCOUNTER — TELEPHONE (OUTPATIENT)
Dept: PRIMARY CARE CLINIC | Age: 69
End: 2024-12-02

## 2024-12-02 NOTE — TELEPHONE ENCOUNTER
Patient has appointment on 12/18. Do you want patient to have labs done prior to appointment? Last labs were 9/12

## 2024-12-09 RX ORDER — GEMFIBROZIL 600 MG/1
600 TABLET, FILM COATED ORAL 2 TIMES DAILY
Qty: 180 TABLET | Refills: 1 | Status: SHIPPED | OUTPATIENT
Start: 2024-12-09

## 2024-12-18 ENCOUNTER — OFFICE VISIT (OUTPATIENT)
Dept: PRIMARY CARE CLINIC | Age: 69
End: 2024-12-18

## 2024-12-18 VITALS
WEIGHT: 207 LBS | BODY MASS INDEX: 32.49 KG/M2 | DIASTOLIC BLOOD PRESSURE: 76 MMHG | SYSTOLIC BLOOD PRESSURE: 136 MMHG | HEIGHT: 67 IN | RESPIRATION RATE: 20 BRPM | OXYGEN SATURATION: 97 % | HEART RATE: 76 BPM | TEMPERATURE: 97.9 F

## 2024-12-18 DIAGNOSIS — I49.9 CARDIAC ARRHYTHMIA, UNSPECIFIED CARDIAC ARRHYTHMIA TYPE: ICD-10-CM

## 2024-12-18 DIAGNOSIS — M79.644 THUMB PAIN, RIGHT: ICD-10-CM

## 2024-12-18 DIAGNOSIS — L40.9 PSORIASIS: ICD-10-CM

## 2024-12-18 DIAGNOSIS — R53.83 OTHER FATIGUE: ICD-10-CM

## 2024-12-18 DIAGNOSIS — Z79.899 LONG TERM CURRENT USE OF THERAPEUTIC DRUG: ICD-10-CM

## 2024-12-18 DIAGNOSIS — E78.2 MIXED HYPERLIPIDEMIA: ICD-10-CM

## 2024-12-18 DIAGNOSIS — K21.9 GASTROESOPHAGEAL REFLUX DISEASE WITHOUT ESOPHAGITIS: ICD-10-CM

## 2024-12-18 DIAGNOSIS — E55.9 VITAMIN D DEFICIENCY: ICD-10-CM

## 2024-12-18 DIAGNOSIS — M54.2 NECK PAIN: ICD-10-CM

## 2024-12-18 DIAGNOSIS — Z91.89 AT RISK FOR CORONARY ARTERY DISEASE: Primary | ICD-10-CM

## 2024-12-18 DIAGNOSIS — N40.1 BENIGN PROSTATIC HYPERPLASIA WITH URINARY RETENTION: ICD-10-CM

## 2024-12-18 DIAGNOSIS — E11.65 TYPE 2 DIABETES MELLITUS WITH HYPERGLYCEMIA, WITHOUT LONG-TERM CURRENT USE OF INSULIN (HCC): ICD-10-CM

## 2024-12-18 DIAGNOSIS — R33.8 BENIGN PROSTATIC HYPERPLASIA WITH URINARY RETENTION: ICD-10-CM

## 2024-12-18 ASSESSMENT — ENCOUNTER SYMPTOMS
CHEST TIGHTNESS: 0
NAUSEA: 0
CONSTIPATION: 0
DIARRHEA: 0
COUGH: 0
ABDOMINAL PAIN: 0
SORE THROAT: 0
VOMITING: 0
BLOOD IN STOOL: 0
RHINORRHEA: 0
EYE PAIN: 0
SHORTNESS OF BREATH: 0

## 2024-12-18 NOTE — PROGRESS NOTES
SPINE (2-3 VIEWS)     Standing Status:   Future     Standing Expiration Date:   12/18/2025     Order Specific Question:   Reason for exam:     Answer:   pain    XR SHOULDER LEFT (MIN 2 VIEWS)     Standing Status:   Future     Standing Expiration Date:   12/18/2025     Order Specific Question:   Reason for exam:     Answer:   pain    Comprehensive Metabolic Panel     Standing Status:   Future     Standing Expiration Date:   12/18/2025    Magnesium     Standing Status:   Future     Standing Expiration Date:   12/18/2025    Lipid, Fasting     Standing Status:   Future     Standing Expiration Date:   12/18/2025    Vitamin D 25 Hydroxy     Standing Status:   Future     Standing Expiration Date:   12/18/2025    TSH     Standing Status:   Future     Standing Expiration Date:   12/18/2025    Urinalysis     Standing Status:   Future     Standing Expiration Date:   12/18/2025    Vitamin B12 & Folate     Standing Status:   Future     Standing Expiration Date:   12/18/2025    CBC with Auto Differential     Standing Status:   Future     Standing Expiration Date:   12/18/2025    Albumin/Creatinine Ratio, Urine     Standing Status:   Future     Standing Expiration Date:   12/18/2025    [facility]     Referral Priority:   Routine     Referral Type:   Eval and Treat     Referral Reason:   Specialty Services Required     Requested Specialty:   Cardiology     Number of Visits Requested:   1     Requested Prescriptions      No prescriptions requested or ordered in this encounter        Be Brasher MD  12/18/2024  11:15 AM

## 2024-12-20 ENCOUNTER — TELEPHONE (OUTPATIENT)
Dept: FAMILY MEDICINE CLINIC | Age: 69
End: 2024-12-20

## 2024-12-20 DIAGNOSIS — M54.2 NECK PAIN: Primary | ICD-10-CM

## 2024-12-20 NOTE — TELEPHONE ENCOUNTER
Please let the patient know that x-ray of the left shoulder and cervical spine per radiology report suggested    X-ray of the left shoulder showed some arthritic changes to the acromioclavicular joint of the shoulder.  Otherwise no noted abnormality of the shoulder    X-ray of the cervical spine showed multi level arthritic type changes of the facet joints of the vertebra with arthritic bony outgrowth's and degenerative disc disease most prominently seen at C4-C7    There were no signs of any acute fractures or dislocations    I would recommend considering physical therapy geared towards the neck region and could consider pain management evaluation to help with symptoms    Thanks

## 2024-12-20 NOTE — TELEPHONE ENCOUNTER
Orders Placed This Encounter   Procedures    [facility]     Referral Priority:   Routine     Referral Type:   Eval and Treat     Referral Reason:   Specialty Services Required     Requested Specialty:   Physical Therapy     Number of Visits Requested:   1     Referral placed to [facility] physical therapy in [place] for neck pain as above

## 2024-12-20 NOTE — TELEPHONE ENCOUNTER
Patient notified.  Would like to know who you recommend for therapy in [facility] [place] [facility].

## 2025-01-09 ENCOUNTER — OFFICE VISIT (OUTPATIENT)
Dept: FAMILY MEDICINE CLINIC | Age: 70
End: 2025-01-09

## 2025-01-09 ENCOUNTER — TELEPHONE (OUTPATIENT)
Dept: FAMILY MEDICINE CLINIC | Age: 70
End: 2025-01-09

## 2025-01-09 VITALS
BODY MASS INDEX: 32.96 KG/M2 | OXYGEN SATURATION: 97 % | TEMPERATURE: 100.8 F | HEART RATE: 95 BPM | HEIGHT: 67 IN | DIASTOLIC BLOOD PRESSURE: 70 MMHG | WEIGHT: 210 LBS | RESPIRATION RATE: 18 BRPM | SYSTOLIC BLOOD PRESSURE: 136 MMHG

## 2025-01-09 DIAGNOSIS — R51.9 NONINTRACTABLE HEADACHE, UNSPECIFIED CHRONICITY PATTERN, UNSPECIFIED HEADACHE TYPE: ICD-10-CM

## 2025-01-09 DIAGNOSIS — J06.9 ACUTE UPPER RESPIRATORY INFECTION: Primary | ICD-10-CM

## 2025-01-09 DIAGNOSIS — R05.9 COUGH, UNSPECIFIED TYPE: ICD-10-CM

## 2025-01-09 DIAGNOSIS — J02.9 SORE THROAT: ICD-10-CM

## 2025-01-09 DIAGNOSIS — R50.9 FEVER, UNSPECIFIED FEVER CAUSE: ICD-10-CM

## 2025-01-09 LAB
INFLUENZA A ANTIGEN, POC: NORMAL
INFLUENZA B ANTIGEN, POC: NORMAL
Lab: NORMAL
PERFORMING INSTRUMENT: NORMAL
QC PASS/FAIL: NORMAL
SARS-COV-2, POC: NORMAL

## 2025-01-09 RX ORDER — AZITHROMYCIN 250 MG/1
TABLET, FILM COATED ORAL
Qty: 6 TABLET | Refills: 0 | Status: SHIPPED | OUTPATIENT
Start: 2025-01-09 | End: 2025-01-19

## 2025-01-09 RX ORDER — PREDNISONE 10 MG/1
10 TABLET ORAL 2 TIMES DAILY
Qty: 10 TABLET | Refills: 0 | Status: SHIPPED | OUTPATIENT
Start: 2025-01-09 | End: 2025-01-14

## 2025-01-09 RX ORDER — CEFDINIR 300 MG/1
300 CAPSULE ORAL 2 TIMES DAILY
Qty: 14 CAPSULE | Refills: 0 | Status: SHIPPED | OUTPATIENT
Start: 2025-01-09 | End: 2025-01-16

## 2025-01-09 NOTE — PROGRESS NOTES
Determinants of Health     Financial Resource Strain: Low Risk  (3/13/2024)    Overall Financial Resource Strain (CARDIA)     Difficulty of Paying Living Expenses: Not hard at all   Food Insecurity: No Food Insecurity (3/13/2024)    Hunger Vital Sign     Worried About Running Out of Food in the Last Year: Never true     Ran Out of Food in the Last Year: Never true   Transportation Needs: Unknown (3/13/2024)    PRAPARE - Transportation     Lack of Transportation (Medical): Not on file     Lack of Transportation (Non-Medical): No   Physical Activity: Insufficiently Active (6/19/2024)    Exercise Vital Sign     Days of Exercise per Week: 4 days     Minutes of Exercise per Session: 20 min   Stress: Not on file   Social Connections: Not on file   Intimate Partner Violence: Not on file   Housing Stability: Unknown (3/13/2024)    Housing Stability Vital Sign     Unable to Pay for Housing in the Last Year: Not on file     Number of Places Lived in the Last Year: Not on file     Unstable Housing in the Last Year: No         Vitals:    01/09/25 1300   BP: 136/70   Site: Left Upper Arm   Position: Sitting   Cuff Size: Large Adult   Pulse: 95   Resp: 18   Temp: (!) 100.8 °F (38.2 °C)   TempSrc: Temporal   SpO2: 97%   Weight: 95.3 kg (210 lb)   Height: 1.702 m (5' 7\")       Exam:  Physical Exam  Constitutional:       Appearance: He is well-developed.   HENT:      Head: Normocephalic and atraumatic.      Right Ear: External ear normal.      Left Ear: External ear normal.      Nose: Congestion and rhinorrhea present.   Eyes:      Pupils: Pupils are equal, round, and reactive to light.   Neck:      Thyroid: No thyromegaly.   Cardiovascular:      Rate and Rhythm: Normal rate and regular rhythm.      Heart sounds: Normal heart sounds. No murmur heard.  Pulmonary:      Effort: Pulmonary effort is normal.      Breath sounds: Examination of the left-middle field reveals wheezing. Examination of the left-lower field reveals wheezing.

## 2025-01-09 NOTE — TELEPHONE ENCOUNTER
Please let the patient know that chest x-ray per radiology report was considered negative    No sign of pneumonia or abnormal fluid collections    Recommend to continue current treatment and notify if symptoms or not improving    Thanks

## 2025-01-16 ENCOUNTER — TELEPHONE (OUTPATIENT)
Dept: FAMILY MEDICINE CLINIC | Age: 70
End: 2025-01-16

## 2025-01-16 NOTE — TELEPHONE ENCOUNTER
Patient called in reporting that his cough, congestion, and runny nose continue.  Patient was asking for refill on antibiotic.  Patient has completed prednisone and z-elkin.  Patient still has a couple of doses of cefdinir.  Informed patient that Dr. Brasher is out of the office and his symptoms would need to be re-evaluated.  Advised patient that he could be seen through [facility] for evaluation.  Patient is agreeable.  Patient lives in [place], informed patient that [facility] [place].

## 2025-02-11 RX ORDER — GLIMEPIRIDE 4 MG/1
4 TABLET ORAL DAILY
Qty: 90 TABLET | Refills: 0 | Status: SHIPPED | OUTPATIENT
Start: 2025-02-11

## 2025-02-11 NOTE — TELEPHONE ENCOUNTER
Name of Medication(s) Requested:  Requested Prescriptions     Pending Prescriptions Disp Refills    glimepiride (AMARYL) 4 MG tablet 90 tablet 0     Sig: Take 1 tablet by mouth daily       Medication is on current medication list Yes    Dosage and directions were verified? Yes    Quantity verified: 90 day supply     Pharmacy Verified?  Yes    Last Appointment:  1/9/2025    Future appts:  Future Appointments   Date Time Provider Department Center   3/19/2025 10:45 AM Be Brasher MD [facility]        (If no appt send self scheduling link. .REFILLAPPT)  Scheduling request sent?     [] Yes  [x] No    Does patient need updated?  [] Yes  [x] No

## 2025-04-04 NOTE — TELEPHONE ENCOUNTER
Name of Medication(s) Requested:  Requested Prescriptions      No prescriptions requested or ordered in this encounter       Medication is on current medication list Yes    Dosage and directions were verified? Yes    Quantity verified: 90 day supply     Pharmacy Verified?  Yes    Last Appointment:  1/9/2025    Future appts:  Future Appointments   Date Time Provider Department Center   6/4/2025  1:45 PM Be Brasher MD [facility]        (If no appt send self scheduling link. .REFILLAPPT)  Scheduling request sent?     [] Yes  [] No    Does patient need updated?  [] Yes  [] No

## 2025-05-20 DIAGNOSIS — E11.65 TYPE 2 DIABETES MELLITUS WITH HYPERGLYCEMIA, WITHOUT LONG-TERM CURRENT USE OF INSULIN (HCC): ICD-10-CM

## 2025-05-20 RX ORDER — EMPAGLIFLOZIN 25 MG/1
25 TABLET, FILM COATED ORAL DAILY
Qty: 90 TABLET | Refills: 0 | Status: SHIPPED | OUTPATIENT
Start: 2025-05-20

## 2025-05-29 LAB
ALBUMIN: 4.4 G/DL
ALP BLD-CCNC: 53 U/L
ALT SERPL-CCNC: 26 U/L
ANION GAP SERPL CALCULATED.3IONS-SCNC: ABNORMAL MMOL/L
AST SERPL-CCNC: 22 U/L
BASOPHILS ABSOLUTE: 32 /ΜL
BASOPHILS RELATIVE PERCENT: 0.5 %
BILIRUB SERPL-MCNC: 1.7 MG/DL (ref 0.1–1.4)
BUN BLDV-MCNC: 15 MG/DL
CALCIUM SERPL-MCNC: 9.6 MG/DL
CHLORIDE BLD-SCNC: 102 MMOL/L
CHOLESTEROL, FASTING: 179
CO2: 26 MMOL/L
CREAT SERPL-MCNC: 0.87 MG/DL
EOSINOPHILS ABSOLUTE: 128 /ΜL
EOSINOPHILS RELATIVE PERCENT: 2 %
FOLATE: >24
GFR, ESTIMATED: 93
GLUCOSE BLD-MCNC: 181 MG/DL
HCT VFR BLD CALC: 50.3 % (ref 41–53)
HDLC SERPL-MCNC: 45 MG/DL (ref 35–70)
HEMOGLOBIN: 17.4 G/DL (ref 13.5–17.5)
LDL CHOLESTEROL: 95
LYMPHOCYTES ABSOLUTE: 2029 /ΜL
LYMPHOCYTES RELATIVE PERCENT: 31.7 %
MAGNESIUM: 2 MG/DL
MCH RBC QN AUTO: 31.1 PG
MCHC RBC AUTO-ENTMCNC: 34.6 G/DL
MCV RBC AUTO: 90 FL
MONOCYTES ABSOLUTE: 454 /ΜL
MONOCYTES RELATIVE PERCENT: 7.1 %
NEUTROPHILS ABSOLUTE: 3757 /ΜL
NEUTROPHILS RELATIVE PERCENT: 58.7 %
PDW BLD-RTO: 12.7 %
PLATELET # BLD: 225 K/ΜL
PMV BLD AUTO: 10.8 FL
POTASSIUM SERPL-SCNC: 4.6 MMOL/L
RBC # BLD: 5.59 10^6/ΜL
SODIUM BLD-SCNC: 138 MMOL/L
TOTAL PROTEIN: 7.2 G/DL (ref 6.4–8.2)
TRIGLYCERIDE, FASTING: 270
TSH SERPL DL<=0.05 MIU/L-ACNC: 1.26 UIU/ML
VITAMIN B-12: 621
VITAMIN D 25-HYDROXY: 33
VITAMIN D2, 25 HYDROXY: NORMAL
VITAMIN D3,25 HYDROXY: NORMAL
WBC # BLD: 6.4 10^3/ML

## 2025-05-30 DIAGNOSIS — R53.83 OTHER FATIGUE: ICD-10-CM

## 2025-05-30 DIAGNOSIS — E78.2 MIXED HYPERLIPIDEMIA: ICD-10-CM

## 2025-05-30 DIAGNOSIS — Z79.899 LONG TERM CURRENT USE OF THERAPEUTIC DRUG: ICD-10-CM

## 2025-05-30 DIAGNOSIS — E55.9 VITAMIN D DEFICIENCY: ICD-10-CM

## 2025-06-04 ENCOUNTER — OFFICE VISIT (OUTPATIENT)
Dept: PRIMARY CARE CLINIC | Age: 70
End: 2025-06-04

## 2025-06-04 VITALS
TEMPERATURE: 98.5 F | BODY MASS INDEX: 32.02 KG/M2 | HEART RATE: 85 BPM | HEIGHT: 67 IN | OXYGEN SATURATION: 97 % | SYSTOLIC BLOOD PRESSURE: 114 MMHG | WEIGHT: 204 LBS | DIASTOLIC BLOOD PRESSURE: 60 MMHG

## 2025-06-04 DIAGNOSIS — L40.9 PSORIASIS: ICD-10-CM

## 2025-06-04 DIAGNOSIS — R17 ELEVATED BILIRUBIN: ICD-10-CM

## 2025-06-04 DIAGNOSIS — E11.65 TYPE 2 DIABETES MELLITUS WITH HYPERGLYCEMIA, WITHOUT LONG-TERM CURRENT USE OF INSULIN (HCC): Primary | ICD-10-CM

## 2025-06-04 DIAGNOSIS — N40.1 BENIGN PROSTATIC HYPERPLASIA WITH URINARY RETENTION: ICD-10-CM

## 2025-06-04 DIAGNOSIS — E78.2 MIXED HYPERLIPIDEMIA: ICD-10-CM

## 2025-06-04 DIAGNOSIS — I49.9 CARDIAC ARRHYTHMIA, UNSPECIFIED CARDIAC ARRHYTHMIA TYPE: ICD-10-CM

## 2025-06-04 DIAGNOSIS — E55.9 VITAMIN D DEFICIENCY: ICD-10-CM

## 2025-06-04 DIAGNOSIS — Z12.11 SCREENING FOR MALIGNANT NEOPLASM OF COLON: ICD-10-CM

## 2025-06-04 DIAGNOSIS — K21.9 GASTROESOPHAGEAL REFLUX DISEASE WITHOUT ESOPHAGITIS: ICD-10-CM

## 2025-06-04 DIAGNOSIS — R33.8 BENIGN PROSTATIC HYPERPLASIA WITH URINARY RETENTION: ICD-10-CM

## 2025-06-04 LAB — HBA1C MFR BLD: 7.7 %

## 2025-06-04 RX ORDER — RISANKIZUMAB-RZAA 150 MG/ML
INJECTION SUBCUTANEOUS
COMMUNITY
Start: 2025-04-13 | End: 2025-06-04

## 2025-06-04 SDOH — ECONOMIC STABILITY: FOOD INSECURITY: WITHIN THE PAST 12 MONTHS, THE FOOD YOU BOUGHT JUST DIDN'T LAST AND YOU DIDN'T HAVE MONEY TO GET MORE.: NEVER TRUE

## 2025-06-04 SDOH — ECONOMIC STABILITY: FOOD INSECURITY: WITHIN THE PAST 12 MONTHS, YOU WORRIED THAT YOUR FOOD WOULD RUN OUT BEFORE YOU GOT MONEY TO BUY MORE.: NEVER TRUE

## 2025-06-04 ASSESSMENT — ENCOUNTER SYMPTOMS
CONSTIPATION: 0
ABDOMINAL PAIN: 0
SORE THROAT: 0
RHINORRHEA: 0
EYE PAIN: 0
DIARRHEA: 0
CHEST TIGHTNESS: 0
NAUSEA: 0
SHORTNESS OF BREATH: 0
COUGH: 0
VOMITING: 0
BLOOD IN STOOL: 0

## 2025-06-04 ASSESSMENT — PATIENT HEALTH QUESTIONNAIRE - PHQ9
SUM OF ALL RESPONSES TO PHQ QUESTIONS 1-9: 0
SUM OF ALL RESPONSES TO PHQ QUESTIONS 1-9: 0
2. FEELING DOWN, DEPRESSED OR HOPELESS: NOT AT ALL
SUM OF ALL RESPONSES TO PHQ QUESTIONS 1-9: 0
SUM OF ALL RESPONSES TO PHQ QUESTIONS 1-9: 0
1. LITTLE INTEREST OR PLEASURE IN DOING THINGS: NOT AT ALL

## 2025-06-09 ENCOUNTER — TELEPHONE (OUTPATIENT)
Dept: FAMILY MEDICINE CLINIC | Age: 70
End: 2025-06-09

## 2025-06-09 DIAGNOSIS — Z12.11 SCREENING FOR MALIGNANT NEOPLASM OF COLON: ICD-10-CM

## 2025-06-09 LAB
CONTROL: NORMAL
FECAL BLOOD IMMUNOCHEMICAL TEST: NORMAL

## 2025-06-09 NOTE — TELEPHONE ENCOUNTER
Please let the patient know that stool test for colon cancer screening called FIT testing looking for blood in the stool was considered negative    Recommend repeating testing in 1 year or colonoscopy or sooner evaluation if any new changes new concerns    Thanks

## 2025-06-10 ENCOUNTER — TELEPHONE (OUTPATIENT)
Dept: FAMILY MEDICINE CLINIC | Age: 70
End: 2025-06-10

## 2025-06-10 NOTE — TELEPHONE ENCOUNTER
Please let the patient know that ultrasound of the liver per radiology report suggested    Ultrasound suggested changes of fatty liver    Otherwise visualized portions of the bile ducts gallbladder and right kidney appeared normal    Recommend risk factor modification  -Low-fat Mediterranean type diet  -Diabetes and sugar control  -Weight loss  -Continue monitoring with blood work  -Consideration for referral to GI for fatty liver especially if not improving    Thanks

## 2025-06-28 ENCOUNTER — APPOINTMENT (OUTPATIENT)
Dept: GENERAL RADIOLOGY | Age: 70
End: 2025-06-28
Payer: MEDICARE

## 2025-06-28 ENCOUNTER — HOSPITAL ENCOUNTER (EMERGENCY)
Age: 70
Discharge: HOME | End: 2025-06-28
Attending: EMERGENCY MEDICINE
Payer: MEDICARE

## 2025-06-28 VITALS
TEMPERATURE: 98.3 F | OXYGEN SATURATION: 96 % | SYSTOLIC BLOOD PRESSURE: 160 MMHG | RESPIRATION RATE: 16 BRPM | HEART RATE: 80 BPM | DIASTOLIC BLOOD PRESSURE: 81 MMHG

## 2025-06-28 DIAGNOSIS — R07.9 CHEST PAIN, UNSPECIFIED TYPE: ICD-10-CM

## 2025-06-28 DIAGNOSIS — K85.90 ACUTE PANCREATITIS, UNSPECIFIED COMPLICATION STATUS, UNSPECIFIED PANCREATITIS TYPE: Primary | ICD-10-CM

## 2025-06-28 LAB
ALBUMIN SERPL-MCNC: 4 G/DL (ref 3.5–5.2)
ALP SERPL-CCNC: 76 U/L (ref 40–129)
ALT SERPL-CCNC: 39 U/L (ref 0–50)
AMYLASE SERPL-CCNC: 122 U/L (ref 28–100)
ANION GAP SERPL CALCULATED.3IONS-SCNC: 13 MMOL/L (ref 7–16)
AST SERPL-CCNC: 34 U/L (ref 0–50)
BILIRUB DIRECT SERPL-MCNC: 0.4 MG/DL (ref 0–0.2)
BILIRUB INDIRECT SERPL-MCNC: 0.6 MG/DL (ref 0–1)
BILIRUB SERPL-MCNC: 1.1 MG/DL (ref 0–1.2)
BNP SERPL-MCNC: 95 PG/ML (ref 0–125)
BUN SERPL-MCNC: 18 MG/DL (ref 8–23)
CALCIUM SERPL-MCNC: 9.5 MG/DL (ref 8.8–10.2)
CHLORIDE SERPL-SCNC: 102 MMOL/L (ref 98–107)
CK SERPL-CCNC: 201 U/L (ref 0–190)
CO2 SERPL-SCNC: 21 MMOL/L (ref 22–29)
CREAT SERPL-MCNC: 0.8 MG/DL (ref 0.7–1.2)
D-DIMER QUANTITATIVE: <200 NG/ML DDU (ref 0–230)
EKG ATRIAL RATE: 77 BPM
EKG P AXIS: 22 DEGREES
EKG P-R INTERVAL: 158 MS
EKG Q-T INTERVAL: 366 MS
EKG QRS DURATION: 80 MS
EKG QTC CALCULATION (BAZETT): 414 MS
EKG R AXIS: -10 DEGREES
EKG T AXIS: 28 DEGREES
EKG VENTRICULAR RATE: 77 BPM
ERYTHROCYTE [DISTWIDTH] IN BLOOD BY AUTOMATED COUNT: 12.2 % (ref 11.5–15)
GFR, ESTIMATED: >90 ML/MIN/1.73M2
GLUCOSE SERPL-MCNC: 264 MG/DL (ref 74–99)
HCT VFR BLD AUTO: 44.7 % (ref 37–54)
HGB BLD-MCNC: 16.1 G/DL (ref 12.5–16.5)
INR PPP: 1
LIPASE SERPL-CCNC: 168 U/L (ref 13–60)
MAGNESIUM SERPL-MCNC: 1.8 MG/DL (ref 1.6–2.4)
MCH RBC QN AUTO: 31 PG (ref 26–35)
MCHC RBC AUTO-ENTMCNC: 36 G/DL (ref 32–34.5)
MCV RBC AUTO: 86 FL (ref 80–99.9)
PLATELET # BLD AUTO: 188 K/UL (ref 130–450)
PMV BLD AUTO: 10.4 FL (ref 7–12)
POTASSIUM SERPL-SCNC: 4.3 MMOL/L (ref 3.5–5.1)
PROT SERPL-MCNC: 6.8 G/DL (ref 6.4–8.3)
PROTHROMBIN TIME: 10.7 SEC (ref 9.3–12.4)
RBC # BLD AUTO: 5.2 M/UL (ref 3.8–5.8)
SODIUM SERPL-SCNC: 136 MMOL/L (ref 136–145)
TROPONIN I SERPL HS-MCNC: 15 NG/L (ref 0–22)
TROPONIN I SERPL HS-MCNC: 16 NG/L (ref 0–22)
WBC OTHER # BLD: 7.7 K/UL (ref 4.5–11.5)

## 2025-06-28 PROCEDURE — 82150 ASSAY OF AMYLASE: CPT

## 2025-06-28 PROCEDURE — 82550 ASSAY OF CK (CPK): CPT

## 2025-06-28 PROCEDURE — 82248 BILIRUBIN DIRECT: CPT

## 2025-06-28 PROCEDURE — 71045 X-RAY EXAM CHEST 1 VIEW: CPT

## 2025-06-28 PROCEDURE — 85027 COMPLETE CBC AUTOMATED: CPT

## 2025-06-28 PROCEDURE — 80053 COMPREHEN METABOLIC PANEL: CPT

## 2025-06-28 PROCEDURE — 93005 ELECTROCARDIOGRAM TRACING: CPT | Performed by: EMERGENCY MEDICINE

## 2025-06-28 PROCEDURE — 83690 ASSAY OF LIPASE: CPT

## 2025-06-28 PROCEDURE — 85379 FIBRIN DEGRADATION QUANT: CPT

## 2025-06-28 PROCEDURE — 99285 EMERGENCY DEPT VISIT HI MDM: CPT

## 2025-06-28 PROCEDURE — 83735 ASSAY OF MAGNESIUM: CPT

## 2025-06-28 PROCEDURE — 84484 ASSAY OF TROPONIN QUANT: CPT

## 2025-06-28 PROCEDURE — 85610 PROTHROMBIN TIME: CPT

## 2025-06-28 PROCEDURE — 93010 ELECTROCARDIOGRAM REPORT: CPT | Performed by: INTERNAL MEDICINE

## 2025-06-28 PROCEDURE — 83880 ASSAY OF NATRIURETIC PEPTIDE: CPT

## 2025-06-28 NOTE — ED PROVIDER NOTES
HPI:  6/28/25, Time: 5:07 AM EDT     Ja Rosario is a 69 y.o. male presenting to the ED for Atypical type chest pain pinching sensation in his left side of his chest worse with movement he is a diabetic hypertension high cholesterol he does not drink alcohol but he was found to have mild pancreatitis but he has no abdominal pain no vomiting it could be because he has a history of high triglycerides I told him he needs to have this rechecked as soon as possible, beginning hours ago.  The complaint has been persistent, moderate in severity, and worsened by nothing.  Do not have a family history of heart attacks his symptoms are atypical his EKG is his neck does not show any acute changes first troponin is negative we can recheck his troponin here he takes aspirin every day I did discuss admitting the patient however patient did not want to be admitted he was competent sober and stable to make decision not to be admitted but I told he needs to follow-up with cardiology soon as possible as well as a gastroenterologist have his pancreatitis rechecked he is also a diabetic    ROS:   Pertinent positives and negatives are stated within HPI, all other systems reviewed and are negative.  --------------------------------------------- PAST HISTORY ---------------------------------------------  Past Medical History:  has a past medical history of Hyperlipidemia, Obesity (BMI 35.0-39.9 without comorbidity), and T2DM (type 2 diabetes mellitus) (HCC).    Past Surgical History:  has a past surgical history that includes TURP and [place] tooth extraction.    Social History:  reports that he has never smoked. He has never used smokeless tobacco. He reports that he does not currently use alcohol. He reports that he does not use drugs.    Family History: family history includes Cancer in his maternal grandmother and mother; Stroke in his father.     The patient’s home medications have been reviewed.    Allergies: Patient has no known  and vital signs as below have been reviewed by myself.  BP (!) 160/81   Pulse 80   Temp 98.3 °F (36.8 °C) (Oral)   Resp 16   SpO2 96%   Oxygen Saturation Interpretation: Normal    The patient’s available past medical records and past encounters were reviewed.        ------------------------------ ED COURSE/MEDICAL DECISION MAKING----------------------  Medications - No data to display          Medical Decision Making:     Ja Rosario is a 69 y.o. male presenting to the ED for Atypical type chest pain pinching sensation in his left side of his chest worse with movement he is a diabetic hypertension high cholesterol he does not drink alcohol but he was found to have mild pancreatitis but he has no abdominal pain no vomiting it could be because he has a history of high triglycerides I told him he needs to have this rechecked as soon as possible, beginning hours ago.  The complaint has been persistent, moderate in severity, and worsened by nothing.  Do not have a family history of heart attacks his symptoms are atypical his EKG is his neck does not show any acute changes first troponin is negative we can recheck his troponin here he takes aspirin every day I did discuss admitting the patient however patient did not want to be admitted he was competent sober and stable to make decision not to be admitted but I told he needs to follow-up with cardiology soon as possible as well as a gastroenterologist have his pancreatitis rechecked he is also a diabetic    Re-Evaluations:             Re-evaluation.  Patient’s symptoms are improving      Consultations:                 Critical Care:         This patient's ED course included: a personal history and physicial examination, re-evaluation prior to disposition, multiple bedside re-evaluations, IV medications, cardiac monitoring, continuous pulse oximetry, complex medical decision making and emergency management, and a personal history and physicial eaxmination    This

## 2025-06-28 NOTE — DISCHARGE INSTRUCTIONS
Have your pancreatic enzymes rechecked as soon as possible return if increased abdominal pain chest pain shortness of breath or any bleeding follow-up with a Gastroenterologist as well as a cardiologist as soon as possible continue take your aspirin every day

## 2025-06-30 ENCOUNTER — TELEPHONE (OUTPATIENT)
Dept: PRIMARY CARE CLINIC | Age: 70
End: 2025-06-30

## 2025-06-30 ENCOUNTER — TELEPHONE (OUTPATIENT)
Dept: ADMINISTRATIVE | Age: 70
End: 2025-06-30

## 2025-06-30 DIAGNOSIS — R07.9 CHEST PAIN, UNSPECIFIED TYPE: Primary | ICD-10-CM

## 2025-06-30 NOTE — TELEPHONE ENCOUNTER
Orders Placed This Encounter   Procedures    [facility] Cardiology     Referral Priority:   Routine     Referral Type:   Eval and Treat     Referral Reason:   Specialty Services Required     Requested Specialty:   Cardiology     Number of Visits Requested:   1     I placed an order for just [facility] cardiology in general but referred to the [place] office.  Unless the patient specifically wanted to see a particular provider I just placed a general referral for chest pain to the [facility] office

## 2025-06-30 NOTE — TELEPHONE ENCOUNTER
Pt needs referral to Dr Aida Galindo, cardiovascular. He had chest pain on sat. Went to the ED and was told to follow with this Dr. And the ED didn't refer him.

## 2025-06-30 NOTE — TELEPHONE ENCOUNTER
.Patient Appointment Form:      PCP: Be Brasher MD    Referring: Be Brasher MD      Has the Patient:    Seen a Cardiologist? no    Had a heart catheterization? no    Had heart surgery? no    Had a stress test or nuclear stress test? yes   date: 2019   facility name:  [facility]    Had an echocardiogram? no    Had a vascular ultrasound? no    Had a 24/48 heart monitor or extended cardiac event monitor? no    Had recent blood work in the last 6 months? yes    date: 6/28/2025    ordering physician: Gini    Had a pacemaker/ICD/ILR implant? no    Seen an Electrophysiologist? no        Will send records via: in Epic      Date & time of appointment:  7/10/2025 1030 Dr. Jurado

## 2025-07-07 PROBLEM — J39.2 DRY THROAT: Status: RESOLVED | Noted: 2023-11-08 | Resolved: 2025-07-07

## 2025-07-07 PROBLEM — U07.1 COVID-19: Status: RESOLVED | Noted: 2022-10-21 | Resolved: 2025-07-07

## 2025-07-07 PROBLEM — R33.9 URINARY RETENTION: Status: RESOLVED | Noted: 2023-02-06 | Resolved: 2025-07-07

## 2025-07-08 ENCOUNTER — OFFICE VISIT (OUTPATIENT)
Dept: CARDIOLOGY CLINIC | Age: 70
End: 2025-07-08
Payer: MEDICARE

## 2025-07-08 VITALS
HEIGHT: 67 IN | HEART RATE: 89 BPM | WEIGHT: 208 LBS | SYSTOLIC BLOOD PRESSURE: 146 MMHG | RESPIRATION RATE: 16 BRPM | DIASTOLIC BLOOD PRESSURE: 74 MMHG | BODY MASS INDEX: 32.65 KG/M2

## 2025-07-08 DIAGNOSIS — I51.9 HEART PROBLEM: Primary | ICD-10-CM

## 2025-07-08 PROCEDURE — 1123F ACP DISCUSS/DSCN MKR DOCD: CPT | Performed by: INTERNAL MEDICINE

## 2025-07-08 PROCEDURE — 1159F MED LIST DOCD IN RCRD: CPT | Performed by: INTERNAL MEDICINE

## 2025-07-08 PROCEDURE — 99203 OFFICE O/P NEW LOW 30 MIN: CPT | Performed by: INTERNAL MEDICINE

## 2025-07-08 PROCEDURE — 1160F RVW MEDS BY RX/DR IN RCRD: CPT | Performed by: INTERNAL MEDICINE

## 2025-07-08 PROCEDURE — 93000 ELECTROCARDIOGRAM COMPLETE: CPT | Performed by: INTERNAL MEDICINE

## 2025-07-08 RX ORDER — POLYETHYLENE GLYCOL 3350 17 G/17G
17 POWDER, FOR SOLUTION ORAL DAILY
COMMUNITY

## 2025-07-08 NOTE — PROGRESS NOTES
Chief Complaint   Patient presents with    heart problem       Patient Active Problem List    Diagnosis Date Noted    Acute pancreatitis 06/28/2025    Benign prostatic hyperplasia with urinary retention 08/02/2023    Gastroesophageal reflux disease without esophagitis 08/02/2023    Mixed hyperlipidemia 07/28/2023    Psoriasis 07/28/2023    Type 2 diabetes mellitus with hyperglycemia, without long-term current use of insulin ([facility]) 03/24/2023    Obesity (BMI 35.0-39.9 without comorbidity)        Current Outpatient Medications   Medication Sig Dispense Refill    polyethylene glycol (GLYCOLAX) 17 GM/SCOOP powder Take 17 g by mouth daily      JARDIANCE 25 MG tablet Take 1 tablet by mouth once daily 90 tablet 0    metFORMIN (GLUCOPHAGE) 1000 MG tablet Take 1 tablet by mouth 2 times daily (with meals) 180 tablet 1    glimepiride (AMARYL) 4 MG tablet Take 1 tablet by mouth daily 90 tablet 0    gemfibrozil (LOPID) 600 MG tablet Take 1 tablet by mouth 2 times daily 180 tablet 1    rosuvastatin (CRESTOR) 10 MG tablet Take 1 tablet by mouth daily 90 tablet 1    MENAQUINONE-7 PO Take by mouth      BERBERINE CHLORIDE PO Take by mouth      vitamin D (CHOLECALCIFEROL) 25 MCG (1000 UT) TABS tablet Take 1 tablet by mouth daily Patient taking 2 tablets a day      Multiple Vitamins-Minerals (THERAPEUTIC MULTIVITAMIN-MINERALS) tablet Take 1 tablet by mouth daily      Omega-3 Fatty Acids (FISH OIL) 1200 MG CAPS Take by mouth      ASPIRIN 81 PO Take 81 mg by mouth daily       No current facility-administered medications for this visit.        No Known Allergies    Vitals:    07/08/25 1000   BP: (!) 146/74   Pulse: 89   Resp: 16   Weight: 94.3 kg (208 lb)   Height: 1.702 m (5' 7\")                 SUBJECTIVE: Ja Rosario presents to the office today for consult - dr grace Bruno of DM, HLD, normal nuclear stress tests 2012 and 2019  Visited ED recently for well localized left upper chest discomfrot - w/u neg for coronary ischemia.  Elevations

## 2025-07-08 NOTE — PATIENT INSTRUCTIONS
See dr ewdards tomorrow about elevation in blood tests amylase and lipase and what to do about that

## 2025-07-09 ENCOUNTER — OFFICE VISIT (OUTPATIENT)
Dept: PRIMARY CARE CLINIC | Age: 70
End: 2025-07-09

## 2025-07-09 VITALS
OXYGEN SATURATION: 97 % | BODY MASS INDEX: 32.11 KG/M2 | DIASTOLIC BLOOD PRESSURE: 68 MMHG | HEART RATE: 84 BPM | SYSTOLIC BLOOD PRESSURE: 130 MMHG | RESPIRATION RATE: 18 BRPM | WEIGHT: 205 LBS | TEMPERATURE: 97.8 F

## 2025-07-09 DIAGNOSIS — K76.0 FATTY LIVER: ICD-10-CM

## 2025-07-09 DIAGNOSIS — K21.9 GASTROESOPHAGEAL REFLUX DISEASE WITHOUT ESOPHAGITIS: ICD-10-CM

## 2025-07-09 DIAGNOSIS — R17 ELEVATED BILIRUBIN: ICD-10-CM

## 2025-07-09 DIAGNOSIS — R13.10 DYSPHAGIA, UNSPECIFIED TYPE: ICD-10-CM

## 2025-07-09 DIAGNOSIS — E78.2 MIXED HYPERLIPIDEMIA: ICD-10-CM

## 2025-07-09 DIAGNOSIS — R74.8 ELEVATION OF SERUM AMYLASE LEVEL WITH ELEVATION OF SERUM LIPASE LEVEL: Primary | ICD-10-CM

## 2025-07-09 RX ORDER — FAMOTIDINE 20 MG/1
20 TABLET, FILM COATED ORAL 2 TIMES DAILY
Qty: 60 TABLET | Refills: 5 | Status: SHIPPED | OUTPATIENT
Start: 2025-07-09

## 2025-07-09 ASSESSMENT — ENCOUNTER SYMPTOMS
CHEST TIGHTNESS: 0
BLOOD IN STOOL: 0
RHINORRHEA: 0
EYE PAIN: 0
SORE THROAT: 0
DIARRHEA: 0
COUGH: 0
VOMITING: 0
NAUSEA: 0
SHORTNESS OF BREATH: 0
ABDOMINAL PAIN: 0
CONSTIPATION: 0

## 2025-07-09 NOTE — PROGRESS NOTES
[facility]   Internal Medicine     2025  Ja Rosario : 1955 Sex: male  Age:69 y.o.    Chief Complaint   Patient presents with    ER follow up     Pancreatitis, chest pain        HPI:   Patient presents to office for evaluation of the following medical concerns.    ER () -atypical chest pain alcohol history abnormal labs BMP glucose 264 LFTs negative amylase elevated 122 lipase elevated 168 D-dimer negative INR negative troponin negative (CXR) borderline cardiomegaly with no acute cardiopulmonary disease ER concerned for pancreatitis recommending follow-up with GI cardiology  - has seen cardiology (2025) - non cardiac chest pain no further work up   - no abdo pain. NO nausea or vomiting, No further chest pain     - [facility] has had lifeline screening (). [facility] has fam hx of strokes. US carotid showed mild plaque. PAD, AAA negative, EKG no atrial fib NSR, PVC     - [facility] has been having right neck and shoulder pain. States intermittent.   Xr left shoulder () - AC joint degenerative changes. No acute abnormality  Xr c-spine () - Multilevel degenerative joint disease within the facets.  Some posterior osteophyte formation C4-C6 and possibly at C6-C7.  Minimal degenerative anterolisthesis C6 on C7. C4-C7 degenerative disc disease    - [facility] has thumb pain. Right sided. [facility] has seen ortho hand. States had injection. States was given braces but did not. Has not had follow up.  - States to see orthopedic in [place]     - has been been having nocturnal muscle cramps.     - States has diabetes.   - Did not schedule with diabetes education.   - On metformin,   - On glimepiride,   - On jardiance states due to stomach aches and cramps, improved.  - Has seen endocrinology last received note  () -diabetes worsening control with A1c 9.0 declined medication changes continuing metformin 1000 mg twice a day Amaryl 4 mg once a day, Jardiance to 25 mg daily, refusing insulin,

## 2025-07-17 ENCOUNTER — TELEPHONE (OUTPATIENT)
Dept: PRIMARY CARE CLINIC | Age: 70
End: 2025-07-17

## 2025-07-17 DIAGNOSIS — E11.65 TYPE 2 DIABETES MELLITUS WITH HYPERGLYCEMIA, WITHOUT LONG-TERM CURRENT USE OF INSULIN (HCC): Primary | ICD-10-CM

## 2025-07-17 NOTE — TELEPHONE ENCOUNTER
Orders Placed This Encounter   Procedures    Alphonse Munson DPM, Podiatry, [place]     Referral Priority:   Routine     Referral Type:   Eval and Treat     Referral Reason:   Specialty Services Required     Referred to Provider:   Alphonse Dsouza DPM     Requested Specialty:   Foot and Ankle Surgery     Number of Visits Requested:   1     Referral placed as above

## 2025-07-24 ENCOUNTER — INITIAL CONSULT (OUTPATIENT)
Dept: GASTROENTEROLOGY | Age: 70
End: 2025-07-24
Payer: MEDICARE

## 2025-07-24 VITALS
TEMPERATURE: 97.4 F | BODY MASS INDEX: 31.8 KG/M2 | SYSTOLIC BLOOD PRESSURE: 122 MMHG | HEIGHT: 67 IN | DIASTOLIC BLOOD PRESSURE: 64 MMHG | WEIGHT: 202.6 LBS | OXYGEN SATURATION: 97 % | HEART RATE: 83 BPM | RESPIRATION RATE: 16 BRPM

## 2025-07-24 DIAGNOSIS — K76.0 FATTY LIVER: ICD-10-CM

## 2025-07-24 DIAGNOSIS — K21.9 GASTROESOPHAGEAL REFLUX DISEASE WITHOUT ESOPHAGITIS: ICD-10-CM

## 2025-07-24 DIAGNOSIS — K76.0 FATTY LIVER: Primary | ICD-10-CM

## 2025-07-24 LAB
ALBUMIN: 4.3 G/DL (ref 3.5–5.2)
ALP BLD-CCNC: 74 U/L (ref 40–129)
ALT SERPL-CCNC: 42 U/L (ref 0–50)
AST SERPL-CCNC: 41 U/L (ref 0–50)
BASOPHILS ABSOLUTE: 0.06 K/UL (ref 0–0.2)
BASOPHILS RELATIVE PERCENT: 1 % (ref 0–2)
BILIRUB SERPL-MCNC: 1.3 MG/DL (ref 0–1.2)
BILIRUBIN DIRECT: 0.4 MG/DL (ref 0–0.2)
BILIRUBIN, INDIRECT: 0.9 MG/DL (ref 0–1)
EOSINOPHILS ABSOLUTE: 0.13 K/UL (ref 0.05–0.5)
EOSINOPHILS RELATIVE PERCENT: 2 % (ref 0–6)
GGT, 20027: 128 U/L (ref 8–61)
HBV SURFACE AB TITR SER: 165 MIU/ML (ref 0–9.99)
HCT VFR BLD CALC: 47.1 % (ref 37–54)
HEMOGLOBIN: 16.2 G/DL (ref 12.5–16.5)
HEP B S AGB SURF AG: NONREACTIVE
HEPATITIS C ANTIBODY: NONREACTIVE
IMMATURE GRANULOCYTES %: 1 % (ref 0–5)
IMMATURE GRANULOCYTES ABSOLUTE: 0.05 K/UL (ref 0–0.58)
INR BLD: 1
IRON % SATURATION: 17 % (ref 20–55)
IRON: 69 UG/DL (ref 61–157)
LYMPHOCYTES ABSOLUTE: 2.2 K/UL (ref 1.5–4)
LYMPHOCYTES RELATIVE PERCENT: 28 % (ref 20–42)
MCH RBC QN AUTO: 31.5 PG (ref 26–35)
MCHC RBC AUTO-ENTMCNC: 34.4 G/DL (ref 32–34.5)
MCV RBC AUTO: 91.5 FL (ref 80–99.9)
MONOCYTES ABSOLUTE: 0.66 K/UL (ref 0.1–0.95)
MONOCYTES RELATIVE PERCENT: 9 % (ref 2–12)
NEUTROPHILS ABSOLUTE: 4.66 K/UL (ref 1.8–7.3)
NEUTROPHILS RELATIVE PERCENT: 60 % (ref 43–80)
PDW BLD-RTO: 13.3 % (ref 11.5–15)
PLATELET CONFIRMATION: NORMAL
PLATELET, FLUORESCENCE: 182 K/UL (ref 130–450)
PMV BLD AUTO: 11.7 FL (ref 7–12)
PROTHROMBIN TIME: 11.2 SEC (ref 9.3–12.4)
RBC # BLD: 5.15 M/UL (ref 3.8–5.8)
TOTAL IRON BINDING CAPACITY: 400 UG/DL (ref 250–450)
TOTAL PROTEIN: 7.5 G/DL (ref 6.4–8.3)
WBC # BLD: 7.8 K/UL (ref 4.5–11.5)

## 2025-07-24 PROCEDURE — 99203 OFFICE O/P NEW LOW 30 MIN: CPT | Performed by: NURSE PRACTITIONER

## 2025-07-24 PROCEDURE — 1159F MED LIST DOCD IN RCRD: CPT | Performed by: NURSE PRACTITIONER

## 2025-07-24 PROCEDURE — 1123F ACP DISCUSS/DSCN MKR DOCD: CPT | Performed by: NURSE PRACTITIONER

## 2025-07-24 RX ORDER — DOXYCYCLINE HYCLATE 50 MG/1
CAPSULE ORAL
COMMUNITY
Start: 2025-07-16

## 2025-07-24 NOTE — PROGRESS NOTES
Ja Rosario (:  1955) is a 69 y.o. male, here for evaluation of the following chief complaint(s):  New Patient (Patient referred by dr edwards for evaluation of elevated labs and fatty liver and dysphagia.)      SUBJECTIVE/OBJECTIVE:  HPI:      Ja is a very pleasant 69-year-old gentleman that presents today for evaluation of elevated bilirubin and fatty liver/ dysphagia    Ultrasound of the liver in  showed hepatic steatosis.  Lab work in May showed an elevated total bili of 1.7.  In , lab work showed a normal total bili but mildly elevated direct bili. Otherwise, liver functions are WNL's    Presented to the ER in  with atypical chest pain.  There is a history of alcohol use.  Patient is also a diabetic.   No imaging at the time but amylase and lipase were elevated.  Discharged with a possible diagnosis of pancreatitis  Most recent hemoglobin A1c is 7.7.  Has been a diabetic for many years    Patient complains of pain with swallowing when consuming bread.  States it goes down \"slowly\".  Denies choking or regurgitation.  Taking Famotidine 20 mg twice a day  Denies heartburn or recent chest pain.  No recent weight loss.    Not a smoker  No alcohol intake    Patient is scheduled for an Esophagram and CT    ROS:  General: Patient denies n/v/f/c or weight loss.  HEENT: Patient denies persistent postnasal drip, scleral icterus, drooling, persistent bleeding from nose/mouth.  Resp: Patient denies SOB, wheezing, productive cough.  Cards: Patient denies CP, palpitations, significant edema  GI: As above.  Derm: Patient denies jaundice/rashes.   Musc: Patient denies diffuse/irregular joint swelling or myalgias.      Objective   Wt Readings from Last 3 Encounters:   25 91.9 kg (202 lb 9.6 oz)   25 93 kg (205 lb)   25 94.3 kg (208 lb)     Temp Readings from Last 3 Encounters:   25 97.4 °F (36.3 °C) (Infrared)   25 97.8 °F (36.6 °C)   25 98.3 °F (36.8 °C) (Oral)     BP

## 2025-07-25 LAB — ANTI-MITOCHONDRIAL AB, IFA: NEGATIVE

## 2025-07-26 LAB
CERULOPLASMIN: 20 MG/DL (ref 15–30)
HAV AB SERPL IA-ACNC: NONREACTIVE
HEPATITIS B CORE TOTAL ANTIBODY: NONREACTIVE

## 2025-08-29 ENCOUNTER — HOSPITAL ENCOUNTER (OUTPATIENT)
Dept: CT IMAGING | Age: 70
Discharge: HOME | End: 2025-08-31
Attending: INTERNAL MEDICINE
Payer: MEDICARE

## 2025-08-29 ENCOUNTER — HOSPITAL ENCOUNTER (OUTPATIENT)
Dept: LAB | Age: 70
Discharge: HOME | End: 2025-08-29
Attending: INTERNAL MEDICINE
Payer: MEDICARE

## 2025-08-29 ENCOUNTER — HOSPITAL ENCOUNTER (OUTPATIENT)
Dept: GENERAL RADIOLOGY | Age: 70
Discharge: HOME | End: 2025-08-31
Attending: INTERNAL MEDICINE
Payer: MEDICARE

## 2025-08-29 DIAGNOSIS — E78.2 MIXED HYPERLIPIDEMIA: ICD-10-CM

## 2025-08-29 DIAGNOSIS — K76.0 FATTY LIVER: ICD-10-CM

## 2025-08-29 DIAGNOSIS — R74.8 ELEVATION OF SERUM AMYLASE LEVEL WITH ELEVATION OF SERUM LIPASE LEVEL: ICD-10-CM

## 2025-08-29 DIAGNOSIS — R13.10 DYSPHAGIA, UNSPECIFIED TYPE: ICD-10-CM

## 2025-08-29 DIAGNOSIS — R17 ELEVATED BILIRUBIN: ICD-10-CM

## 2025-08-29 LAB
ALBUMIN SERPL-MCNC: 4.1 G/DL (ref 3.5–5.2)
ALP SERPL-CCNC: 72 U/L (ref 40–129)
ALT SERPL-CCNC: 36 U/L (ref 0–50)
AMYLASE SERPL-CCNC: 69 U/L (ref 28–100)
ANION GAP SERPL CALCULATED.3IONS-SCNC: 13 MMOL/L (ref 7–16)
AST SERPL-CCNC: 33 U/L (ref 0–50)
BILIRUB SERPL-MCNC: 2 MG/DL (ref 0–1.2)
BUN SERPL-MCNC: 14 MG/DL (ref 8–23)
CALCIUM SERPL-MCNC: 9.1 MG/DL (ref 8.8–10.2)
CHLORIDE SERPL-SCNC: 101 MMOL/L (ref 98–107)
CHOLEST SERPL-MCNC: 144 MG/DL
CO2 SERPL-SCNC: 23 MMOL/L (ref 22–29)
CREAT SERPL-MCNC: 0.8 MG/DL (ref 0.7–1.2)
GFR, ESTIMATED: >90 ML/MIN/1.73M2
GLUCOSE SERPL-MCNC: 264 MG/DL (ref 74–99)
HDLC SERPL-MCNC: 43 MG/DL
LDLC SERPL CALC-MCNC: 43 MG/DL
LIPASE SERPL-CCNC: 48 U/L (ref 13–60)
POTASSIUM SERPL-SCNC: 4.5 MMOL/L (ref 3.5–5.1)
PROT SERPL-MCNC: 7.3 G/DL (ref 6.4–8.3)
SODIUM SERPL-SCNC: 136 MMOL/L (ref 136–145)
TRIGL SERPL-MCNC: 289 MG/DL
VLDLC SERPL CALC-MCNC: 58 MG/DL

## 2025-08-29 PROCEDURE — 6360000004 HC RX CONTRAST MEDICATION: Performed by: RADIOLOGY

## 2025-08-29 PROCEDURE — 80053 COMPREHEN METABOLIC PANEL: CPT

## 2025-08-29 PROCEDURE — 82150 ASSAY OF AMYLASE: CPT

## 2025-08-29 PROCEDURE — 83690 ASSAY OF LIPASE: CPT

## 2025-08-29 PROCEDURE — 80061 LIPID PANEL: CPT

## 2025-08-29 PROCEDURE — 74177 CT ABD & PELVIS W/CONTRAST: CPT

## 2025-08-29 PROCEDURE — 36415 COLL VENOUS BLD VENIPUNCTURE: CPT

## 2025-08-29 RX ORDER — IOPAMIDOL 755 MG/ML
75 INJECTION, SOLUTION INTRAVASCULAR
Status: COMPLETED | OUTPATIENT
Start: 2025-08-29 | End: 2025-08-29

## 2025-08-29 RX ORDER — IOPAMIDOL 755 MG/ML
18 INJECTION, SOLUTION INTRAVASCULAR
Status: COMPLETED | OUTPATIENT
Start: 2025-08-29 | End: 2025-08-29

## 2025-08-29 RX ADMIN — IOPAMIDOL 18 ML: 755 INJECTION, SOLUTION INTRAVENOUS at 09:25

## 2025-08-29 RX ADMIN — IOPAMIDOL 75 ML: 755 INJECTION, SOLUTION INTRAVENOUS at 09:25

## 2025-09-01 ENCOUNTER — TELEPHONE (OUTPATIENT)
Dept: FAMILY MEDICINE CLINIC | Age: 70
End: 2025-09-01

## 2025-09-02 ENCOUNTER — HOSPITAL ENCOUNTER (OUTPATIENT)
Dept: GENERAL RADIOLOGY | Age: 70
Discharge: HOME | End: 2025-09-04
Attending: INTERNAL MEDICINE
Payer: MEDICARE

## 2025-09-02 PROCEDURE — 74220 X-RAY XM ESOPHAGUS 1CNTRST: CPT

## 2025-09-02 PROCEDURE — 6370000000 HC RX 637 (ALT 250 FOR IP): Performed by: FAMILY MEDICINE

## 2025-09-02 PROCEDURE — 2500000003 HC RX 250 WO HCPCS: Performed by: FAMILY MEDICINE

## 2025-09-02 RX ADMIN — BARIUM SULFATE 140 ML: 980 POWDER, FOR SUSPENSION ORAL at 08:39

## 2025-09-02 RX ADMIN — BARIUM SULFATE 1 TABLET: 700 TABLET ORAL at 08:38

## 2025-09-02 RX ADMIN — BARIUM SULFATE 176 G: 960 POWDER, FOR SUSPENSION ORAL at 08:38

## 2025-09-02 RX ADMIN — ANTACID/ANTIFLATULENT 1 EACH: 380; 550; 10; 10 GRANULE, EFFERVESCENT ORAL at 08:39

## 2025-09-05 ENCOUNTER — OFFICE VISIT (OUTPATIENT)
Dept: PRIMARY CARE CLINIC | Age: 70
End: 2025-09-05

## 2025-09-05 VITALS
TEMPERATURE: 98 F | DIASTOLIC BLOOD PRESSURE: 68 MMHG | RESPIRATION RATE: 16 BRPM | WEIGHT: 204 LBS | SYSTOLIC BLOOD PRESSURE: 130 MMHG | OXYGEN SATURATION: 97 % | HEART RATE: 83 BPM | BODY MASS INDEX: 31.95 KG/M2

## 2025-09-05 VITALS
HEART RATE: 83 BPM | HEIGHT: 67 IN | WEIGHT: 204 LBS | BODY MASS INDEX: 32.02 KG/M2 | OXYGEN SATURATION: 97 % | SYSTOLIC BLOOD PRESSURE: 130 MMHG | TEMPERATURE: 98 F | RESPIRATION RATE: 16 BRPM | DIASTOLIC BLOOD PRESSURE: 68 MMHG

## 2025-09-05 DIAGNOSIS — K76.0 FATTY LIVER: ICD-10-CM

## 2025-09-05 DIAGNOSIS — L40.9 PSORIASIS: ICD-10-CM

## 2025-09-05 DIAGNOSIS — R17 ELEVATED BILIRUBIN: ICD-10-CM

## 2025-09-05 DIAGNOSIS — R74.8 ELEVATION OF SERUM AMYLASE LEVEL WITH ELEVATION OF SERUM LIPASE LEVEL: Primary | ICD-10-CM

## 2025-09-05 DIAGNOSIS — R33.8 BENIGN PROSTATIC HYPERPLASIA WITH URINARY RETENTION: ICD-10-CM

## 2025-09-05 DIAGNOSIS — N40.1 BENIGN PROSTATIC HYPERPLASIA WITH URINARY RETENTION: ICD-10-CM

## 2025-09-05 DIAGNOSIS — E78.2 MIXED HYPERLIPIDEMIA: ICD-10-CM

## 2025-09-05 DIAGNOSIS — K21.9 GASTROESOPHAGEAL REFLUX DISEASE WITHOUT ESOPHAGITIS: ICD-10-CM

## 2025-09-05 DIAGNOSIS — Z00.00 MEDICARE ANNUAL WELLNESS VISIT, SUBSEQUENT: Primary | ICD-10-CM

## 2025-09-05 DIAGNOSIS — Z91.89 AT RISK FOR CORONARY ARTERY DISEASE: ICD-10-CM

## 2025-09-05 DIAGNOSIS — E11.65 TYPE 2 DIABETES MELLITUS WITH HYPERGLYCEMIA, WITHOUT LONG-TERM CURRENT USE OF INSULIN (HCC): ICD-10-CM

## 2025-09-05 DIAGNOSIS — R13.10 DYSPHAGIA, UNSPECIFIED TYPE: ICD-10-CM

## 2025-09-05 RX ORDER — GLIMEPIRIDE 4 MG/1
4 TABLET ORAL DAILY
Qty: 90 TABLET | Refills: 1 | Status: SHIPPED | OUTPATIENT
Start: 2025-09-05

## 2025-09-05 RX ORDER — ROSUVASTATIN CALCIUM 10 MG/1
10 TABLET, COATED ORAL DAILY
Qty: 90 TABLET | Refills: 1 | Status: SHIPPED | OUTPATIENT
Start: 2025-09-05

## 2025-09-05 RX ORDER — GEMFIBROZIL 600 MG/1
600 TABLET, FILM COATED ORAL 2 TIMES DAILY
Qty: 180 TABLET | Refills: 1 | Status: SHIPPED | OUTPATIENT
Start: 2025-09-05

## 2025-09-05 ASSESSMENT — ENCOUNTER SYMPTOMS
VOMITING: 0
DIARRHEA: 0
SHORTNESS OF BREATH: 0
COUGH: 0
EYE PAIN: 0
CONSTIPATION: 0
ABDOMINAL PAIN: 0
CHEST TIGHTNESS: 0
RHINORRHEA: 0
BLOOD IN STOOL: 0
SORE THROAT: 0
NAUSEA: 0

## 2025-09-05 ASSESSMENT — PATIENT HEALTH QUESTIONNAIRE - PHQ9
SUM OF ALL RESPONSES TO PHQ QUESTIONS 1-9: 0
1. LITTLE INTEREST OR PLEASURE IN DOING THINGS: NOT AT ALL
2. FEELING DOWN, DEPRESSED OR HOPELESS: NOT AT ALL
SUM OF ALL RESPONSES TO PHQ QUESTIONS 1-9: 0